# Patient Record
Sex: FEMALE | Race: WHITE | NOT HISPANIC OR LATINO | Employment: FULL TIME | URBAN - NONMETROPOLITAN AREA
[De-identification: names, ages, dates, MRNs, and addresses within clinical notes are randomized per-mention and may not be internally consistent; named-entity substitution may affect disease eponyms.]

---

## 2018-06-28 ENCOUNTER — HOSPITAL ENCOUNTER (OUTPATIENT)
Dept: NON INVASIVE DIAGNOSTICS | Facility: HOSPITAL | Age: 53
Discharge: HOME | End: 2018-06-28
Attending: ORTHOPAEDIC SURGERY
Payer: COMMERCIAL

## 2018-06-28 ENCOUNTER — APPOINTMENT (OUTPATIENT)
Dept: PREADMISSION TESTING | Facility: HOSPITAL | Age: 53
End: 2018-06-28
Payer: COMMERCIAL

## 2018-06-28 ENCOUNTER — TRANSCRIBE ORDERS (OUTPATIENT)
Dept: ADMINISTRATIVE | Facility: HOSPITAL | Age: 53
End: 2018-06-28

## 2018-06-28 ENCOUNTER — APPOINTMENT (OUTPATIENT)
Dept: LAB | Facility: HOSPITAL | Age: 53
End: 2018-06-28
Attending: ORTHOPAEDIC SURGERY
Payer: COMMERCIAL

## 2018-06-28 DIAGNOSIS — Z01.818 PRE-OP EXAM: ICD-10-CM

## 2018-06-28 DIAGNOSIS — Z01.818 PRE-OP EXAM: Primary | ICD-10-CM

## 2018-06-28 LAB
ABO GROUP BLD: NORMAL
ALBUMIN SERPL BCP-MCNC: 3.9 G/DL (ref 3.5–5)
ALP SERPL-CCNC: 85 U/L (ref 46–116)
ALT SERPL W P-5'-P-CCNC: 81 U/L (ref 12–78)
ANION GAP SERPL CALCULATED.3IONS-SCNC: 14 MMOL/L (ref 4–13)
APTT PPP: 31 SECONDS (ref 24–36)
AST SERPL W P-5'-P-CCNC: 34 U/L (ref 5–45)
ATRIAL RATE: 96 BPM
BASOPHILS # BLD MANUAL: 0 THOUSAND/UL (ref 0–0.1)
BASOPHILS NFR MAR MANUAL: 0 % (ref 0–1)
BILIRUB SERPL-MCNC: 0.5 MG/DL (ref 0.2–1)
BILIRUB UR QL STRIP: NEGATIVE
BLD GP AB SCN SERPL QL: NEGATIVE
BUN SERPL-MCNC: 24 MG/DL (ref 5–25)
CALCIUM SERPL-MCNC: 9.7 MG/DL (ref 8.3–10.1)
CHLORIDE SERPL-SCNC: 102 MMOL/L (ref 100–108)
CLARITY UR: NORMAL
CO2 SERPL-SCNC: 24 MMOL/L (ref 21–32)
COLOR UR: YELLOW
CREAT SERPL-MCNC: 0.97 MG/DL (ref 0.6–1.3)
EOSINOPHIL # BLD MANUAL: 0.11 THOUSAND/UL (ref 0–0.4)
EOSINOPHIL NFR BLD MANUAL: 1 % (ref 0–6)
ERYTHROCYTE [DISTWIDTH] IN BLOOD BY AUTOMATED COUNT: 14.3 % (ref 11.6–15.1)
GFR SERPL CREATININE-BSD FRML MDRD: 66 ML/MIN/1.73SQ M
GLUCOSE P FAST SERPL-MCNC: 99 MG/DL (ref 65–99)
GLUCOSE UR STRIP-MCNC: NEGATIVE MG/DL
HCT VFR BLD AUTO: 44.6 % (ref 34.8–46.1)
HGB BLD-MCNC: 15.3 G/DL (ref 11.5–15.4)
HGB UR QL STRIP.AUTO: NEGATIVE
INR PPP: 0.93 (ref 0.86–1.17)
KETONES UR STRIP-MCNC: NEGATIVE MG/DL
LEUKOCYTE ESTERASE UR QL STRIP: NEGATIVE
LYMPHOCYTES # BLD AUTO: 37 % (ref 14–44)
LYMPHOCYTES # BLD AUTO: 4.07 THOUSAND/UL (ref 0.6–4.47)
MCH RBC QN AUTO: 27.1 PG (ref 26.8–34.3)
MCHC RBC AUTO-ENTMCNC: 34.3 G/DL (ref 31.4–37.4)
MCV RBC AUTO: 79 FL (ref 82–98)
MONOCYTES # BLD AUTO: 0.99 THOUSAND/UL (ref 0–1.22)
MONOCYTES NFR BLD: 9 % (ref 4–12)
NEUTROPHILS # BLD MANUAL: 5.61 THOUSAND/UL (ref 1.85–7.62)
NEUTS BAND NFR BLD MANUAL: 2 % (ref 0–8)
NEUTS SEG NFR BLD AUTO: 49 % (ref 43–75)
NITRITE UR QL STRIP: NEGATIVE
P AXIS: 60 DEGREES
PH UR STRIP.AUTO: 6 [PH] (ref 4.5–8)
PLATELET # BLD AUTO: 261 THOUSANDS/UL (ref 149–390)
PMV BLD AUTO: 9.8 FL (ref 8.9–12.7)
POTASSIUM SERPL-SCNC: 3.9 MMOL/L (ref 3.5–5.3)
PR INTERVAL: 146 MS
PROT SERPL-MCNC: 7.2 G/DL (ref 6.4–8.2)
PROT UR STRIP-MCNC: NEGATIVE MG/DL
PROTHROMBIN TIME: 12 SECONDS (ref 11.8–14.2)
QRS AXIS: 56 DEGREES
QRSD INTERVAL: 84 MS
QT INTERVAL: 356 MS
QTC INTERVAL: 449 MS
RBC # BLD AUTO: 5.64 MILLION/UL (ref 3.81–5.12)
RH BLD: POSITIVE
SODIUM SERPL-SCNC: 140 MMOL/L (ref 136–145)
SP GR UR STRIP.AUTO: 1.02 (ref 1–1.03)
SPECIMEN EXPIRATION DATE: NORMAL
T WAVE AXIS: 74 DEGREES
TOTAL CELLS COUNTED SPEC: 100
UROBILINOGEN UR QL STRIP.AUTO: 0.2 E.U./DL
VARIANT LYMPHS # BLD AUTO: 2 %
VENTRICULAR RATE: 96 BPM
WBC # BLD AUTO: 11 THOUSAND/UL (ref 4.31–10.16)

## 2018-06-28 PROCEDURE — 86900 BLOOD TYPING SEROLOGIC ABO: CPT

## 2018-06-28 PROCEDURE — 86901 BLOOD TYPING SEROLOGIC RH(D): CPT

## 2018-06-28 PROCEDURE — 80053 COMPREHEN METABOLIC PANEL: CPT

## 2018-06-28 PROCEDURE — 36415 COLL VENOUS BLD VENIPUNCTURE: CPT

## 2018-06-28 PROCEDURE — 85730 THROMBOPLASTIN TIME PARTIAL: CPT

## 2018-06-28 PROCEDURE — 85027 COMPLETE CBC AUTOMATED: CPT

## 2018-06-28 PROCEDURE — 86850 RBC ANTIBODY SCREEN: CPT

## 2018-06-28 PROCEDURE — 85007 BL SMEAR W/DIFF WBC COUNT: CPT

## 2018-06-28 PROCEDURE — 81003 URINALYSIS AUTO W/O SCOPE: CPT | Performed by: ORTHOPAEDIC SURGERY

## 2018-06-28 PROCEDURE — 85610 PROTHROMBIN TIME: CPT

## 2018-06-28 PROCEDURE — 93010 ELECTROCARDIOGRAM REPORT: CPT | Performed by: INTERNAL MEDICINE

## 2018-06-28 PROCEDURE — 93005 ELECTROCARDIOGRAM TRACING: CPT

## 2018-06-28 RX ORDER — VALSARTAN AND HYDROCHLOROTHIAZIDE 160; 25 MG/1; MG/1
1 TABLET ORAL DAILY
COMMUNITY

## 2018-06-28 RX ORDER — SPIRONOLACTONE 25 MG/1
25 TABLET ORAL DAILY
COMMUNITY

## 2018-06-28 RX ORDER — DIPHENOXYLATE HYDROCHLORIDE AND ATROPINE SULFATE 2.5; .025 MG/1; MG/1
1 TABLET ORAL DAILY
COMMUNITY

## 2018-06-28 RX ORDER — TRAMADOL HYDROCHLORIDE 50 MG/1
50 TABLET ORAL 2 TIMES DAILY
Status: ON HOLD | COMMUNITY
End: 2018-07-11

## 2018-06-28 NOTE — PRE-PROCEDURE INSTRUCTIONS
Pre-Surgery Instructions:   Medication Instructions    diclofenac sodium (VOLTAREN) 50 mg EC tablet Patient was instructed by Physician and understands   multivitamin [facility]) TABS Patient was instructed by Physician and understands   spironolactone (ALDACTONE) 25 mg tablet Instructed patient per Anesthesia Guidelines   traMADol (ULTRAM) 50 mg tablet Instructed patient per Anesthesia Guidelines   valsartan-hydrochlorothiazide (DIOVAN-HCT) 160-25 MG per tablet Instructed patient per Anesthesia Guidelines

## 2018-07-10 ENCOUNTER — ANESTHESIA EVENT (OUTPATIENT)
Dept: PERIOP | Facility: HOSPITAL | Age: 53
DRG: 470 | End: 2018-07-10
Payer: COMMERCIAL

## 2018-07-10 NOTE — ANESTHESIA PREPROCEDURE EVALUATION
Review of Systems/Medical History  Patient summary reviewed  Chart reviewed  No history of anesthetic complications     Cardiovascular  EKG reviewed, Exercise tolerance (METS): >4,  Hypertension controlled,    Pulmonary  Negative pulmonary ROS        GI/Hepatic  Negative GI/hepatic ROS          Negative  ROS        Endo/Other    Obesity    GYN  Negative gynecology ROS          Hematology  Negative hematology ROS      Musculoskeletal    Arthritis     Neurology  Negative neurology ROS      Psychology   Negative psychology ROS              Physical Exam    Airway    Mallampati score: II  TM Distance: >3 FB  Neck ROM: full     Dental   No notable dental hx     Cardiovascular  Rhythm: regular, Rate: normal, Cardiovascular exam normal    Pulmonary  Pulmonary exam normal     Other Findings      Lab Results   Component Value Date    WBC 11 00 (H) 06/28/2018    HGB 15 3 06/28/2018    HCT 44 6 06/28/2018    MCV 79 (L) 06/28/2018     06/28/2018     Lab Results   Component Value Date     06/28/2018    K 3 9 06/28/2018    CO2 24 06/28/2018     06/28/2018    BUN 24 06/28/2018    CREATININE 0 97 06/28/2018     Lab Results   Component Value Date    INR 0 93 06/28/2018    PROTIME 12 0 06/28/2018     Lab Results   Component Value Date    PTT 31 06/28/2018       No results found for: GLUCOSE    No results found for: HGBA1C    Type and Screen:  O      Anesthesia Plan  ASA Score- 2     Anesthesia Type- spinal with ASA Monitors  Additional Monitors:   Airway Plan:         Plan Factors-    Induction-     Postoperative Plan- Plan for postoperative opioid use  Informed Consent- Anesthetic plan and risks discussed with patient

## 2018-07-11 ENCOUNTER — ANESTHESIA (OUTPATIENT)
Dept: PERIOP | Facility: HOSPITAL | Age: 53
DRG: 470 | End: 2018-07-11
Payer: COMMERCIAL

## 2018-07-11 ENCOUNTER — HOSPITAL ENCOUNTER (INPATIENT)
Facility: HOSPITAL | Age: 53
LOS: 3 days | DRG: 470 | End: 2018-07-14
Attending: ORTHOPAEDIC SURGERY | Admitting: ORTHOPAEDIC SURGERY
Payer: COMMERCIAL

## 2018-07-11 DIAGNOSIS — M17.12 PRIMARY OSTEOARTHRITIS OF LEFT KNEE: Primary | ICD-10-CM

## 2018-07-11 DIAGNOSIS — I10 ESSENTIAL HYPERTENSION: ICD-10-CM

## 2018-07-11 PROCEDURE — G8978 MOBILITY CURRENT STATUS: HCPCS | Performed by: PHYSICAL THERAPIST

## 2018-07-11 PROCEDURE — C1776 JOINT DEVICE (IMPLANTABLE): HCPCS | Performed by: ORTHOPAEDIC SURGERY

## 2018-07-11 PROCEDURE — G8987 SELF CARE CURRENT STATUS: HCPCS

## 2018-07-11 PROCEDURE — G8988 SELF CARE GOAL STATUS: HCPCS

## 2018-07-11 PROCEDURE — 97535 SELF CARE MNGMENT TRAINING: CPT

## 2018-07-11 PROCEDURE — 3E0U3BZ INTRODUCTION OF ANESTHETIC AGENT INTO JOINTS, PERCUTANEOUS APPROACH: ICD-10-PCS | Performed by: ORTHOPAEDIC SURGERY

## 2018-07-11 PROCEDURE — 97116 GAIT TRAINING THERAPY: CPT | Performed by: PHYSICAL THERAPIST

## 2018-07-11 PROCEDURE — 97167 OT EVAL HIGH COMPLEX 60 MIN: CPT

## 2018-07-11 PROCEDURE — G8979 MOBILITY GOAL STATUS: HCPCS | Performed by: PHYSICAL THERAPIST

## 2018-07-11 PROCEDURE — 97163 PT EVAL HIGH COMPLEX 45 MIN: CPT | Performed by: PHYSICAL THERAPIST

## 2018-07-11 PROCEDURE — 0SRD069 REPLACEMENT OF LEFT KNEE JOINT WITH OXIDIZED ZIRCONIUM ON POLYETHYLENE SYNTHETIC SUBSTITUTE, CEMENTED, OPEN APPROACH: ICD-10-PCS | Performed by: ORTHOPAEDIC SURGERY

## 2018-07-11 PROCEDURE — C1713 ANCHOR/SCREW BN/BN,TIS/BN: HCPCS | Performed by: ORTHOPAEDIC SURGERY

## 2018-07-11 DEVICE — IMPLANTABLE DEVICE: Type: IMPLANTABLE DEVICE | Site: KNEE | Status: FUNCTIONAL

## 2018-07-11 DEVICE — SMARTSET HV HIGH VISCOSITY BONE CEMENT 40G
Type: IMPLANTABLE DEVICE | Site: KNEE | Status: FUNCTIONAL
Brand: SMARTSET

## 2018-07-11 RX ORDER — MIDAZOLAM HYDROCHLORIDE 1 MG/ML
INJECTION INTRAMUSCULAR; INTRAVENOUS AS NEEDED
Status: DISCONTINUED | OUTPATIENT
Start: 2018-07-11 | End: 2018-07-11 | Stop reason: SURG

## 2018-07-11 RX ORDER — PREGABALIN 50 MG/1
100 CAPSULE ORAL 2 TIMES DAILY
Status: DISCONTINUED | OUTPATIENT
Start: 2018-07-11 | End: 2018-07-14 | Stop reason: HOSPADM

## 2018-07-11 RX ORDER — ACETAMINOPHEN 325 MG/1
975 TABLET ORAL EVERY 8 HOURS SCHEDULED
Status: DISCONTINUED | OUTPATIENT
Start: 2018-07-11 | End: 2018-07-13

## 2018-07-11 RX ORDER — CELECOXIB 200 MG/1
200 CAPSULE ORAL 2 TIMES DAILY
Status: DISCONTINUED | OUTPATIENT
Start: 2018-07-11 | End: 2018-07-14 | Stop reason: HOSPADM

## 2018-07-11 RX ORDER — DEXAMETHASONE SODIUM PHOSPHATE 4 MG/ML
8 INJECTION, SOLUTION INTRA-ARTICULAR; INTRALESIONAL; INTRAMUSCULAR; INTRAVENOUS; SOFT TISSUE ONCE
Status: COMPLETED | OUTPATIENT
Start: 2018-07-11 | End: 2018-07-11

## 2018-07-11 RX ORDER — PROPOFOL 10 MG/ML
INJECTION, EMULSION INTRAVENOUS AS NEEDED
Status: DISCONTINUED | OUTPATIENT
Start: 2018-07-11 | End: 2018-07-11 | Stop reason: SURG

## 2018-07-11 RX ORDER — FENTANYL CITRATE 50 UG/ML
INJECTION, SOLUTION INTRAMUSCULAR; INTRAVENOUS AS NEEDED
Status: DISCONTINUED | OUTPATIENT
Start: 2018-07-11 | End: 2018-07-11 | Stop reason: SURG

## 2018-07-11 RX ORDER — ONDANSETRON 2 MG/ML
4 INJECTION INTRAMUSCULAR; INTRAVENOUS ONCE AS NEEDED
Status: DISCONTINUED | OUTPATIENT
Start: 2018-07-11 | End: 2018-07-11 | Stop reason: HOSPADM

## 2018-07-11 RX ORDER — KETOROLAC TROMETHAMINE 30 MG/ML
INJECTION, SOLUTION INTRAMUSCULAR; INTRAVENOUS AS NEEDED
Status: DISCONTINUED | OUTPATIENT
Start: 2018-07-11 | End: 2018-07-11 | Stop reason: HOSPADM

## 2018-07-11 RX ORDER — BUPIVACAINE HYDROCHLORIDE 2.5 MG/ML
INJECTION, SOLUTION INFILTRATION; PERINEURAL AS NEEDED
Status: DISCONTINUED | OUTPATIENT
Start: 2018-07-11 | End: 2018-07-11 | Stop reason: HOSPADM

## 2018-07-11 RX ORDER — ONDANSETRON 2 MG/ML
INJECTION INTRAMUSCULAR; INTRAVENOUS AS NEEDED
Status: DISCONTINUED | OUTPATIENT
Start: 2018-07-11 | End: 2018-07-11 | Stop reason: SURG

## 2018-07-11 RX ORDER — ASPIRIN 81 MG/1
81 TABLET, CHEWABLE ORAL 2 TIMES DAILY WITH MEALS
Status: DISCONTINUED | OUTPATIENT
Start: 2018-07-11 | End: 2018-07-14 | Stop reason: HOSPADM

## 2018-07-11 RX ORDER — SODIUM CHLORIDE 9 MG/ML
100 INJECTION, SOLUTION INTRAVENOUS CONTINUOUS
Status: DISCONTINUED | OUTPATIENT
Start: 2018-07-11 | End: 2018-07-12

## 2018-07-11 RX ORDER — OXYCODONE HYDROCHLORIDE 5 MG/1
5 TABLET ORAL EVERY 4 HOURS PRN
Status: DISCONTINUED | OUTPATIENT
Start: 2018-07-11 | End: 2018-07-12

## 2018-07-11 RX ORDER — BUPIVACAINE HYDROCHLORIDE 5 MG/ML
INJECTION, SOLUTION PERINEURAL AS NEEDED
Status: DISCONTINUED | OUTPATIENT
Start: 2018-07-11 | End: 2018-07-11 | Stop reason: SURG

## 2018-07-11 RX ORDER — BUPIVACAINE HYDROCHLORIDE AND EPINEPHRINE 2.5; 5 MG/ML; UG/ML
INJECTION, SOLUTION EPIDURAL; INFILTRATION; INTRACAUDAL; PERINEURAL AS NEEDED
Status: DISCONTINUED | OUTPATIENT
Start: 2018-07-11 | End: 2018-07-11 | Stop reason: HOSPADM

## 2018-07-11 RX ORDER — LABETALOL HYDROCHLORIDE 5 MG/ML
10 INJECTION, SOLUTION INTRAVENOUS EVERY 4 HOURS PRN
Status: DISCONTINUED | OUTPATIENT
Start: 2018-07-11 | End: 2018-07-14 | Stop reason: HOSPADM

## 2018-07-11 RX ORDER — CELECOXIB 200 MG/1
200 CAPSULE ORAL ONCE
Status: COMPLETED | OUTPATIENT
Start: 2018-07-11 | End: 2018-07-11

## 2018-07-11 RX ORDER — SODIUM CHLORIDE 9 MG/ML
100 INJECTION, SOLUTION INTRAVENOUS CONTINUOUS
Status: DISCONTINUED | OUTPATIENT
Start: 2018-07-11 | End: 2018-07-11

## 2018-07-11 RX ORDER — SODIUM CHLORIDE, SODIUM LACTATE, POTASSIUM CHLORIDE, CALCIUM CHLORIDE 600; 310; 30; 20 MG/100ML; MG/100ML; MG/100ML; MG/100ML
125 INJECTION, SOLUTION INTRAVENOUS CONTINUOUS
Status: DISCONTINUED | OUTPATIENT
Start: 2018-07-11 | End: 2018-07-11

## 2018-07-11 RX ORDER — OXYCODONE HYDROCHLORIDE 10 MG/1
10 TABLET ORAL EVERY 4 HOURS PRN
Status: DISCONTINUED | OUTPATIENT
Start: 2018-07-11 | End: 2018-07-12

## 2018-07-11 RX ORDER — TRAMADOL HYDROCHLORIDE 50 MG/1
50 TABLET ORAL EVERY 6 HOURS PRN
Qty: 20 TABLET | Refills: 0
Start: 2018-07-11 | End: 2018-07-25 | Stop reason: HOSPADM

## 2018-07-11 RX ORDER — VALSARTAN 80 MG/1
160 TABLET ORAL DAILY
Status: DISCONTINUED | OUTPATIENT
Start: 2018-07-12 | End: 2018-07-14 | Stop reason: HOSPADM

## 2018-07-11 RX ORDER — ROPIVACAINE HYDROCHLORIDE 5 MG/ML
INJECTION, SOLUTION EPIDURAL; INFILTRATION; PERINEURAL AS NEEDED
Status: DISCONTINUED | OUTPATIENT
Start: 2018-07-11 | End: 2018-07-11 | Stop reason: HOSPADM

## 2018-07-11 RX ORDER — PROPOFOL 10 MG/ML
INJECTION, EMULSION INTRAVENOUS CONTINUOUS PRN
Status: DISCONTINUED | OUTPATIENT
Start: 2018-07-11 | End: 2018-07-11 | Stop reason: SURG

## 2018-07-11 RX ORDER — TRAMADOL HYDROCHLORIDE 50 MG/1
50 TABLET ORAL EVERY 6 HOURS PRN
Status: DISCONTINUED | OUTPATIENT
Start: 2018-07-11 | End: 2018-07-12

## 2018-07-11 RX ORDER — PREGABALIN 50 MG/1
100 CAPSULE ORAL ONCE
Status: COMPLETED | OUTPATIENT
Start: 2018-07-11 | End: 2018-07-11

## 2018-07-11 RX ORDER — DICLOFENAC SODIUM 75 MG/1
50 TABLET, DELAYED RELEASE ORAL 2 TIMES DAILY
Status: ON HOLD | COMMUNITY
End: 2018-07-12

## 2018-07-11 RX ORDER — TRANEXAMIC ACID 100 MG/ML
INJECTION, SOLUTION INTRAVENOUS AS NEEDED
Status: DISCONTINUED | OUTPATIENT
Start: 2018-07-11 | End: 2018-07-11 | Stop reason: HOSPADM

## 2018-07-11 RX ORDER — LIDOCAINE HYDROCHLORIDE 20 MG/ML
INJECTION, SOLUTION EPIDURAL; INFILTRATION; INTRACAUDAL; PERINEURAL AS NEEDED
Status: DISCONTINUED | OUTPATIENT
Start: 2018-07-11 | End: 2018-07-11 | Stop reason: SURG

## 2018-07-11 RX ORDER — ACETAMINOPHEN 325 MG/1
975 TABLET ORAL ONCE
Status: COMPLETED | OUTPATIENT
Start: 2018-07-11 | End: 2018-07-11

## 2018-07-11 RX ADMIN — SODIUM CHLORIDE 100 ML/HR: 0.9 INJECTION, SOLUTION INTRAVENOUS at 12:10

## 2018-07-11 RX ADMIN — SODIUM CHLORIDE, SODIUM LACTATE, POTASSIUM CHLORIDE, AND CALCIUM CHLORIDE: .6; .31; .03; .02 INJECTION, SOLUTION INTRAVENOUS at 07:22

## 2018-07-11 RX ADMIN — ACETAMINOPHEN 975 MG: 325 TABLET ORAL at 14:26

## 2018-07-11 RX ADMIN — CELECOXIB 200 MG: 200 CAPSULE ORAL at 07:05

## 2018-07-11 RX ADMIN — TRANEXAMIC ACID 1000 MG: 100 INJECTION, SOLUTION INTRAVENOUS at 07:38

## 2018-07-11 RX ADMIN — ACETAMINOPHEN 975 MG: 325 TABLET ORAL at 21:29

## 2018-07-11 RX ADMIN — MIDAZOLAM HYDROCHLORIDE 1 MG: 1 INJECTION, SOLUTION INTRAMUSCULAR; INTRAVENOUS at 07:21

## 2018-07-11 RX ADMIN — CEFAZOLIN SODIUM 1000 MG: 1 SOLUTION INTRAVENOUS at 16:14

## 2018-07-11 RX ADMIN — ACETAMINOPHEN 975 MG: 325 TABLET ORAL at 07:06

## 2018-07-11 RX ADMIN — MULTIPLE VITAMINS W/ MINERALS TAB 1 TABLET: TAB at 14:26

## 2018-07-11 RX ADMIN — CEFAZOLIN SODIUM 1000 MG: 1 SOLUTION INTRAVENOUS at 23:25

## 2018-07-11 RX ADMIN — PREGABALIN 100 MG: 50 CAPSULE ORAL at 07:06

## 2018-07-11 RX ADMIN — OXYCODONE HYDROCHLORIDE 5 MG: 5 TABLET ORAL at 16:21

## 2018-07-11 RX ADMIN — CELECOXIB 200 MG: 200 CAPSULE ORAL at 18:28

## 2018-07-11 RX ADMIN — ASPIRIN 81 MG 81 MG: 81 TABLET ORAL at 16:14

## 2018-07-11 RX ADMIN — BUPIVACAINE HYDROCHLORIDE 2 ML: 5 INJECTION, SOLUTION EPIDURAL; INTRACAUDAL at 07:31

## 2018-07-11 RX ADMIN — PROPOFOL 50 MCG/KG/MIN: 10 INJECTION, EMULSION INTRAVENOUS at 07:34

## 2018-07-11 RX ADMIN — FENTANYL CITRATE 100 MCG: 50 INJECTION, SOLUTION INTRAMUSCULAR; INTRAVENOUS at 07:25

## 2018-07-11 RX ADMIN — VANCOMYCIN HYDROCHLORIDE 1000 MG: 1 INJECTION, POWDER, LYOPHILIZED, FOR SOLUTION INTRAVENOUS at 07:37

## 2018-07-11 RX ADMIN — CEFAZOLIN SODIUM 2000 MG: 2 SOLUTION INTRAVENOUS at 07:46

## 2018-07-11 RX ADMIN — MIDAZOLAM HYDROCHLORIDE 1 MG: 1 INJECTION, SOLUTION INTRAMUSCULAR; INTRAVENOUS at 07:24

## 2018-07-11 RX ADMIN — ONDANSETRON 4 MG: 2 INJECTION INTRAMUSCULAR; INTRAVENOUS at 08:37

## 2018-07-11 RX ADMIN — PROPOFOL 20 MG: 10 INJECTION, EMULSION INTRAVENOUS at 07:34

## 2018-07-11 RX ADMIN — SODIUM CHLORIDE, SODIUM LACTATE, POTASSIUM CHLORIDE, AND CALCIUM CHLORIDE 125 ML/HR: .6; .31; .03; .02 INJECTION, SOLUTION INTRAVENOUS at 07:11

## 2018-07-11 RX ADMIN — SODIUM CHLORIDE 100 ML/HR: 0.9 INJECTION, SOLUTION INTRAVENOUS at 09:45

## 2018-07-11 RX ADMIN — OXYCODONE HYDROCHLORIDE 5 MG: 5 TABLET ORAL at 23:25

## 2018-07-11 RX ADMIN — PREGABALIN 100 MG: 50 CAPSULE ORAL at 18:28

## 2018-07-11 RX ADMIN — LIDOCAINE HYDROCHLORIDE 3 ML: 20 INJECTION, SOLUTION EPIDURAL; INFILTRATION; INTRACAUDAL; PERINEURAL at 07:28

## 2018-07-11 RX ADMIN — DEXAMETHASONE SODIUM PHOSPHATE 8 MG: 4 INJECTION, SOLUTION INTRAMUSCULAR; INTRAVENOUS at 07:10

## 2018-07-11 RX ADMIN — SODIUM CHLORIDE 100 ML/HR: 0.9 INJECTION, SOLUTION INTRAVENOUS at 21:32

## 2018-07-11 NOTE — OCCUPATIONAL THERAPY NOTE
Occupational Therapy Evaluation     Patient Name: Radha Nettles  IKC[facility] Date: 2018  Problem List  Patient Active Problem List   Diagnosis    Primary osteoarthritis of left knee     Past Medical History  Past Medical History:   Diagnosis Date    Arthritis     Hypertension      Past Surgical History  Past Surgical History:   Procedure Laterality Date    ABDOMINAL SURGERY       SECTION      FOOT SURGERY Right     bunionectomy    UTERINE SUSPENSION             18 1401   Note Type   Note type Eval/Treat   Restrictions/Precautions   Weight Bearing Precautions Per Order Yes   LLE Weight Bearing Per Order WBAT   Other Precautions Chair Alarm; Bed Alarm;Multiple lines;Telemetry; Fall Risk   Pain Assessment   Pain Assessment No/denies pain   Home Living   Type of [address] [place] [address] Two level;Bed/bath upstairs;Stairs to enter without rails  (1 TUTU no HR; 13 steps with HR to 2nd )   Bathroom Shower/Tub Tub/shower unit   Bathroom Toilet Standard   Bathroom Equipment Commode   Bathroom Accessibility [place]   Additional Comments reports she recently purchased a BSC and cane; needs RW for home    Prior Function   Level of [place] Independent with ADLs and functional mobility   Lives With Spouse   ADL Assistance Independent   IADLs Independent   Falls in the last 6 months 0   Vocational Full time employment   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   Psychosocial   Psychosocial (WDL) WDL   ADL   Where Assessed Other (Comment)  (bathroom; EOB)   Grooming Assistance 5  Supervision/Setup   Grooming Deficit Standing with assistive device   LB Dressing Assistance 5  Supervision/Setup   LB Dressing Deficit (don underwear)   Toileting Assistance  5  Supervision/Setup   Toileting Deficit Supervison/safety;Grab bar use   Additional Comments pt donned pull up at bedside and instructed on proper technique for LB dressing after surgery; pt performed toileting tasks at (S) level as well   Bed Mobility   Supine to Sit 5  Supervision   Additional items Bedrails   Sit to Supine (seated in chair at end of session)   Additional Comments SpO2 at start of session was 98% on RA; supine BP: 150/98, HR-116; seated EOB BP-194/84, HR-123; standing BP: 168/101, HR-124; pt did not complain of dizziness throughout session   Transfers   Sit to Stand 5  Supervision   Additional items Verbal cues  (RW)   Stand to Sit 5  Supervision   Additional items Verbal cues  (RW)   Functional Mobility   Functional Mobility 5  Supervision   Additional Comments pt performed FM ~200ft with RW at (S) level with correct sequence of LEs and RW; good safety and good endurance   Additional items Rolling walker   Balance   Static Sitting Good   Dynamic Sitting Good   Static Standing Fair +   Dynamic Standing Fair +   Ambulatory Fair +   Activity Tolerance   Activity Tolerance Patient tolerated treatment well   RUE Assessment   RUE Assessment WFL   LUE Assessment   LUE Assessment WFL   Hand Function   Gross Motor Coordination Functional   Fine Motor Coordination Functional   Sensation   Light Touch No apparent deficits   Sharp/Dull No apparent deficits   Cognition   Overall Cognitive Status WFL   Arousal/Participation Alert   Attention Within functional limits   Orientation Level Oriented X4   Memory Within functional limits   Following Commands Follows all commands and directions without difficulty   Assessment   Limitation Decreased ADL status; Decreased UE strength;Decreased Safe judgement during ADL;Decreased endurance;Decreased self-care trans;Decreased high-level ADLs   Assessment pt presents at evaluation post op day #0, L TKR, WBAT; pt performed at overall (S) level with use of RW; recommend continued OT intervention and plans to attend outpatient PT on d/c starting Monday   Goals   Short Term Goal  pt will perform LB dressing at (I) level   Long Term Goal #1 pt will perform UB/LB bathing and grooming tasks at (I) level Long Term Goal #2 pt will perform FM c RW at mod (I) level with good endurance    Long Term Goal pt will perform all functional transfers at (I) level   Plan   Treatment Interventions ADL retraining;Functional transfer training;UE strengthening/ROM; Endurance training;Patient/family training; Activityengagement   Goal Expiration Date 07/25/18   OT Frequency 3-5x/wk   Recommendation   OT Discharge Recommendation (outpatient PT)   OT - OK to Discharge No   Barthel Index   Feeding 10   Bathing 0   Grooming Score 5   Dressing Score 5   Bladder Score 10   Bowels Score 10   Toilet Use Score 5   Transfers (Bed/Chair) Score 10   Mobility (Level Surface) Score 10   Stairs Score 0   Barthel Index Score 65     Pt will benefit from continued OT services in order to maximize (I) c ADL performance, FM c RW, and improve overall endurance/strength required to complete functional tasks in preparation for d/c  Pt left seated in chair at end of session; all needs within reach; all lines intact; scds connected and turned on

## 2018-07-11 NOTE — ANESTHESIA POSTPROCEDURE EVALUATION
Post-Op Assessment Note      CV Status:  Stable    Post-procedure mental status: sleepy, arousable      Hydration Status:  Euvolemic    PONV Controlled:  Controlled    Airway Patency:  Patent    Post Op Vitals Reviewed: Yes          Staff: Anesthesiologist           /85 (07/11/18 0917)    Temp 97 6 °F (36 4 °C) (07/11/18 0917)    Pulse 85 (07/11/18 0917)   Resp 16 (07/11/18 0917)    SpO2 100 % (07/11/18 0917)

## 2018-07-11 NOTE — ANESTHESIA PROCEDURE NOTES
Spinal Block    Patient location during procedure: OR  Start time: 7/11/2018 7:25 AM  End time: 7/11/2018 7:31 AM  Staffing  Anesthesiologist: Guillermo García  Performed: anesthesiologist   Preanesthetic Checklist  Completed: patient identified, surgical consent, pre-op evaluation, timeout performed, IV checked, risks and benefits discussed and monitors and equipment checked  Spinal Block  Patient position: sitting  Prep: Betadine  Patient monitoring: continuous pulse ox and frequent blood pressure checks  Approach: midline  Location: L3-4  Injection technique: single-shot  Needle  Needle type: pencil-tip   Needle gauge: 25 G  Needle length: 10 cm  Assessment  Sensory level: T10  Injection Assessment:  negative aspiration for heme, no paresthesia on injection and positive aspiration for clear CSF    Post-procedure:  site cleaned  Additional Notes  Sterile prep and drape in accordance with protocol

## 2018-07-11 NOTE — DISCHARGE INSTRUCTIONS
DISCHARGE INSTRUCTIONS  Theodore    Please carefully read this instruction sheet regarding your surgical procedure  We hope this will answer any questions you have  If after reading these instructions you still have questions regarding our surgery, please contact our office at [phone]  What You Need To Do    PAIN MANAGEMENT  You will receive prescriptions to help control your pain  Follow the directions written on the prescription bottles  Have the prescriptions filled whether you think you need to or not  Please make sure you have food in your stomach  Asprin 81 mg  2x/day for 4 weeks  Tylenol 1000 mg  3x/day for 2 weeks  Oxycodone 5 mg  As needed only for severe pain  May be taken every 4-6 hours as necessary  Celebrex 200 mg  2x/day for 2 weeks    ONLY AFTER CELEBREX IS COMPLETE:     Patient may take Ibuprofen (Advil) 800 mg 3x/day    PHYSICAL THERAPY  Physical therapy will be the day after surgery  A prescription will be given to you to take to PT, or you will be instructed in a home therapy exercise program       ICE  The use of ice following surgery can decrease pain and swelling  Apply ice to the operative area for 30 minutes at a time  Remove the ice and keep it off for 30 minutes then reapply  Do this while awake as much as possible during the day after surgery  Frostbite injury should be avoided by preventing direct contact of ice on the skin  DRESSING: Dry Optifoam dressing stays on for 6 days  If significant blood appears on the dressing, then remove and reapply a dry sterile dressing  SHOWER: Wait 24 hours before you can shower  Optifoam dressing can get wet  There is no need to wrap with Saran wrap  Pat dry  Only if water seeps below the dressing, then remove and reapply new sterile dressing       ACTIVITY AFTER SURGERY:  Ankle Pumps: 10 ankle pumps every hour while awake  Frequent mobility: Get uo out of bed for 10-15 minutes every hour while awake  Elevate your feet above the level of your heart while in bed

## 2018-07-11 NOTE — CASE MANAGEMENT
Thank you,  Marisol Aqq  [facility] Utilization Review Department  Phone: [phone]; Fax [phone]  ATTENTION: The Network Utilization Review Department is now centralized for our 9 Facilities  Make a note that we have a new phone and fax numbers for our Department  Please call with any questions or concerns to [phone] and carefully follow the prompts so that you are directed to the right person  All voicemails are confidential  Fax any determinations, approvals, denials, and requests for initial or continue stay review clinical to [phone]  Due to HIGH CALL volume, it would be easier if you could please send faxed requests to expedite your requests and in part, help us provide discharge notifications faster  Initial Clinical Review    Age/Sex: 52 y o  female    Surgery Date: 7/11/18    Procedure: Total Knee Arthroplasty /left) knee; Injection/aspiration (left/) knee    Anesthesia: Spinal    Admission Orders: Date/Time/Statement: INPT  7/11/18 @ 9386   [80221 (CPT®)]    Orders Placed This Encounter   Procedures    Inpatient Admission     Standing Status:   Standing     Number of Occurrences:   1     Order Specific Question:   Admitting Physician     Answer:   Brian Wilcox [[id number]]     Order Specific Question:   Level of Care     Answer:   Med Surg [16]     Order Specific Question:   Estimated length of stay     Answer:   Not Applicable     Comments:   IP authorization for one day       Vital Signs: /65 (BP Location: Left arm)   Pulse 95   Temp (!) 97 3 °F (36 3 °C) (Temporal)   Resp 17   Ht 5' 2" (1 575 m)   Wt 97 3 kg (214 lb 9 6 oz)   SpO2 96%   Breastfeeding?  No   BMI 39 25 kg/m²     Diet:        Diet Orders            Start     Ordered    07/12/18 0000  Diet NPO  Diet effective midnight     Question:  Diet Type  Answer:  NPO    07/11/18 0628    07/11/18 1154  Diet Regular; Regular House  Diet effective now     Question Answer Comment   Diet Type Regular Regular Regular House    RD to adjust diet per protocol?  Yes        07/11/18 6113          Mobility: ACTIVITY AS [place]    DVT Prophylaxis: SEQ COMP DEVICE    Pain Control:   Pain Medications             diclofenac (VOLTAREN) 75 mg EC tablet Take 50 mg by mouth 2 (two) times a day Pt takes PRN    traMADol (ULTRAM) 50 mg tablet Take 1 tablet (50 mg total) by mouth every 6 (six) hours as needed for moderate pain for up to 10 days        INPT  WEIGHT BEARING RESTRICTIONS  NEUROVASCULAR CHECKS Q4H  PT/OT  IS  WALKER ROLLING    Scheduled Meds:  Current Facility-Administered Medications:  acetaminophen 975 mg Oral Q8H [address] Emmanuel Carlson PA-C   aspirin 81 mg Oral BID With Meals Nazario Barrera MD   cefazolin 1,000 mg Intravenous Q8H Emmanuel Carlson PA-C   celecoxib 200 mg Oral BID Emmanuel Carlson PA-C   labetalol 10 mg Intravenous Q4H PRN Phyllis Garrett DO   multivitamin-minerals 1 tablet Oral Daily Nazario Barrera MD   oxyCODONE 10 mg Oral Q4H PRN Emmanuel Carlson PA-C   oxyCODONE 5 mg Oral Q4H PRN Emmanuel Carlson PA-C   pregabalin 100 mg Oral BID Emmanuel Carlson PA-C   sodium chloride 100 mL/hr Intravenous Continuous Phyllis Garrett DO   traMADol 50 mg Oral Q6H PRN Emmanuel Carlson PA-C   [START ON 7/12/2018] valsartan 160 mg Oral Daily Phyllis Garrett DO     Continuous Infusions:  sodium chloride 100 mL/hr     PRN Meds: labetalol    oxyCODONE    oxyCODONE    traMADol

## 2018-07-11 NOTE — PHYSICAL THERAPY NOTE
PT Evaluation     07/11/18 1431   Note Type   Note type Eval/Treat   Pain Assessment   Pain Score No Pain   Pain Location Knee   Pain Orientation Left   Home Living   Type of Home House   Home Layout Multi-level;Bed/bath upstairs;Stairs to enter without rails  (1 TUTU, 13 steps with 1 and 1/2 handrails)   Bathroom Shower/Tub Tub/shower unit   Bathroom Toilet Standard   Bathroom Equipment Commode   Bathroom Accessibility Accessible   Home Equipment Cane   Additional Comments needs RW to return home with   Prior Function   Level of [place] Independent with ADLs and functional mobility   Lives With Spouse   ADL Assistance Independent   IADLs Independent   Vocational Full time employment   Comments (I) with driving   Restrictions/Precautions   Weight Bearing Precautions Per Order Yes   LLE Weight Bearing Per Order WBAT   Other Precautions Chair Alarm;Multiple lines;Telemetry; Fall Risk;WBS   General   Family/Caregiver Present No   Cognition   Arousal/Participation Alert   Orientation Level Oriented X4   Following Commands Follows all commands and directions without difficulty   RLE Assessment   RLE Assessment WFL  (4+/5)   LLE Assessment   LLE Assessment X  ((I) SLR, knee flex to 90 degrees ankle WFL)   Coordination   Sensation X   Light Touch   RLE Light Touch Impaired   RLE Light Touch Comments bilateral foot tingling   LLE Light Touch Impaired   LLE Light Touch Comments bilateral foot tingling   Bed Mobility   Supine to Sit 5  Supervision   Additional items Bedrails;Verbal cues   Sit to Supine (seated in chair alarm on and bell in reach)   Additional Comments supine /98 , seated 195/84  and standing 168/101    Transfers   Sit to Stand 5  Supervision   Additional items Bedrails;Verbal cues  (with RW)   Stand to Sit 5  Supervision   Additional items Verbal cues  (with RW)   Stand pivot 5  Supervision   Additional items Verbal cues  (with RW)   Additional Comments No LOB during transfers and ambulation  Pt able to ambulate with a step through gait pattern and no LOB or complaint of pain dizziness or lightheadedness   Ambulation/Elevation   Gait pattern Decreased L stance; Short stride   Gait Assistance 5  Supervision  (CGA)   Assistive Device Rolling walker   Distance 250ft with RW CGA/Supervision   Balance   Static Sitting Good   Dynamic Sitting Good   Static Standing Fair  (with RW)   Dynamic Standing Fair  (with RW)   Ambulatory Fair  (with RW)   Endurance Deficit   Endurance Deficit Yes   Endurance Deficit Description limited ambulation tolerance due to fatigue   Activity Tolerance   Activity Tolerance Patient tolerated treatment well;Patient limited by fatigue   Assessment   Prognosis Good   Problem List Decreased strength;Decreased range of motion;Decreased endurance; Impaired balance;Decreased mobility; Impaired sensation;Decreased safety awareness;Orthopedic restrictions   Assessment Pt is a 52year old female s/p L TKR WBAT L LE  Pt presents with fair to good ROM strength balance endurance and functional mobility however requires verbal cues for safety and technique during transfers and ambulation with use of RW  Pt is in need of continued activity in PT to improve ROM pain strength balance endurance and all mobility including stair negotiation with return to (I) LOF  Goals   Patient Goals To get stronger and return to work   STG Expiration Date 07/18/18   Short Term Goal #1 (I) with all bed mobility and transfers with RW   Short Term Goal #2 Pt will ambulate 400ft with RW modified Independent and will ascend/descend 11 steps with HR (S)   Plan   Treatment/Interventions Functional transfer training;LE strengthening/ROM; Elevations; Therapeutic exercise; Endurance training;Patient/family training;Bed mobility;Gait training   PT Frequency (BID Mon-Friday and one time sat, sun)   Recommendation   Recommendation Outpatient PT   PT - OK to Discharge No  (when medically stable for discharge)   Pt with SCD's on when PT entered room  SCD's reapplied and turned on with pt seated in chair at bedside with call bell in reach and chair alarm on

## 2018-07-11 NOTE — OP NOTE
Op Note        []Hide copied text  []Alexys for attribution information  Total Knee Arthroplasty Procedure Note         Indications: Severe osteoarthritis /left knee with symptoms limiting function and activities of dailing living including pain after one half flight of steps and descending slowly and one at a time  Unresponsive to NSAIDS, rehabilitation exercises for more than 12 weeks now causing pain, injections of steroids, over the counter knee braces  Assistive walking devices were not helpful or practical Neutriceuticals provides some benefit  Patient describes chronic discomfort and pain  Pre-operative Diagnosis: Degenerative primary osteoarthritis /left knee; morbid obesity     Post-operative Diagnosis: Degenerative arthritis /left knee with varus/valgus deformity     Procedure: Total Knee Arthroplasty /left) knee; Injection/aspiration (left/) knee     Surgeon Zachary Gaitan MD  Assistant Lucy [facility]   Anesthesia: Spinal  Implants Used:  Drains None  Specimen None  Complications NONE  Blood loss: Minimal  Finding:s Primary Osteoarthritis  Fluids: as per anesthesia record        Procedure Details   The patient was seen in the pre-op area  The risks, benefits, complications, treatment options, and expected outcomes were again discussed with the patient  The risks and potential complications of their problem and purposed treatment include but are not limited to infection, bleeding, pain, stiffness,life and limb loss, nerve and vessel injury and complication secondary to the anesthetic  The patient concurred with the proposed plan, giving informed consent and video and photography consent  The site of surgery properly noted/marked  The patient was taken to Operating Room, identified again, procedure verified as right/leftb Total Knee Arthroplasty  A Time Out was held and the above information confirmed      The patient was  placed on the operating table in a supine position   Following the successful induction of anesthesia the right/left lower extremity was scrubbed, prepped and draped in the usual sterile fashion  The  extremity was then elevated, wrapped with a sterile Esmarch, and the tourniquet was inflated to 250 mmHg  A  minimal longitudinal anteromedial incision was made  A medial one-third extensor mechanism approach was performed, with VMO split  The patella was subluxed but not everted and the knee was examined       There was extensive loss of articular cartilage in the medial compartment of the  knee with bone exposed on both the medial femoral condyle and the medial tibial plateau  There was significant degenerative changes involving the patella femoral joint  Using extramedulary femoral/tibial guide systems, routine cuts and anatomic alignment decisions were made for a # 3 right femur with a #3 tibial bone plate preserving a posterior cruciate  with a small bone island posterocentrally and achieving a balance flexion/extension gap with a #11 mm high-density spacer  This allowed extension to zero and full flexion with a balanced flexion/extension gap and normal contact points in the polyethylene in both flexion and extension  The patella was resurfaced with a #-29mm  patella which was medialized  to correspond with a normal central eminence of patella  The patellofemoral tracking was normal  No lateral impingement was present       Having completed the trial reduction, all trial components were removed  A periarticular injection was performed in 7 zones with 120cc's total of Bupivacaine , Ropivacaine  and normal saline for post operative pain relief  The tourniquet was released, hemostasis was obtained and the knee was irrigated with saline and dilute betadine  With routine cement technique then sequentially, the patellar component, tibial component  were cemented in place  The femoral component was cemented/ press fit   Once the cement had cured and all extra cement had been removed, the 11#-mm high-density polyethylene spacer was locked in position on the tibial base plate      Final reduction was the same as the trial reduction with full range of motion, normal stability, normal extensor mechanism function and a normal patellar femoral tracking      Having completed the procedure, the knee was thoroughly cleaned with pulse lavage saline and dilute betadine  The synovium and capsule were then closed with #1 Vicryl,stratofix, extensor mechanism with double-layer #1 Vicryl, subcutaneous with 2-0 Vicryl       The knee was then aspirated of 10 cc serosanguinous fluid and injected with TXA, Torodol, and bupivacaine  The  skin was closed with stratofix and skin glue  A sterile Ag occlusive dressing was applied       Instrument, sponge, and needle counts were correct prior to wound closure and at the conclusion of the case  A PA was necessary for the entirety of the case  To assist with safe transfer, set up , draping, prepping and all aspects of the procedure including the approach, retraction ,assisting with bone cuts, trial and final implant positioning, deep and superficial closure, mixing of cement, dressing application, decreasing operative, tourniquet, anesthesia time and morbidity  No residents were assisting      Mod 22- Morbid obesity- BMI 40   More than 100 lbs above ideal weight made this case 30% more difficult than a mckinley patient, requiring a FTE to hold leg at 90 dur to spring load effect of abundant soft tissue envelope, deep abdominal retractors necessary to retract 2 plus inches of tissue; case required extensile approach, incision, extended tourniquet, anesthesia and operative time adipose tissue                 Disposition: PACU      Condition: stable     Attending Attestation: I was present for the entire procedure        Tracy Lama MD

## 2018-07-11 NOTE — DISCHARGE SUMMARY
ORTHOPEDICS DISCHARGE SUMMARY        @[facility]@         Procedure: Left TKR    HPI:  This is a 52y o  year old female that presented to the office with signs and symptoms of left knee osteoarthritis  They tried and failed conservative treatment measures and wished to proceed with surgical intervention  The risks, benefits, and complications of the procedure were discussed with the patient and informed consent was obtained  Hospital Course: The patient was admitted to the hospital on 7/11/2018 and underwent an uncomplicated left total knee arthroplasty  They were transferred to the floor after a brief stay in the post-anesthesia care unit  Their pain was well managed with IV and oral pain medications  They began therapy on the day of surgery  Aspirin was also started for DVT prophylaxis post operative day #1  Patient did well - was ambulating safely with pain well managed  Anxious for Discharge   On discharge date pt was cleared by PT and the medicine team and determined to be safe for discharge  Daily discussion was had with the patient, nursing staff, orthopaedic team, and family members if present  All questions were answered to the patients satisifaction  0  Lab Value Date/Time   HGB 15 3 06/28/2018 1036         Vital signs remained stable and pt was resuscitated with IVF as needed     Discharge Instructions: The patient was discharged weight bearing as tolerated to the left lower extremity  Continue PT/OT  Take pain medications as instructed  Follow-up in office as scheduled in 2-3 weeks  Sherre Soulier Discharge Medications: For the complete list of discharge medications, please refer to the patient's medication reconciliation

## 2018-07-11 NOTE — SOCIAL WORK
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  Pt and her  both drive  Pt has never had home care or any services in the home  Pt's PCP is Dr Milly Richards   Pt uses the [facility] in [facility]  Pt's Plan is to start PT on the following Monday  At [address]  in [facility]  Case Management reviewed discharge planning process including the following: identifying help that is needed at home, pt's preference for discharge needs and Meds at [facility]  Reviewed with Pt that any member of the healthcare team can answer questions regarding : medications, jmportance of recognizing  Signs and symptoms of any  medical problems  Case Management also encouraged pt to follow up with all recommended appointments after discharge  Savannah Romero

## 2018-07-12 PROBLEM — D72.829 LEUKOCYTOSIS: Status: ACTIVE | Noted: 2018-07-12

## 2018-07-12 PROBLEM — I10 ESSENTIAL HYPERTENSION: Status: ACTIVE | Noted: 2018-07-12

## 2018-07-12 LAB
ALBUMIN SERPL BCP-MCNC: 2.8 G/DL (ref 3.5–5)
ALP SERPL-CCNC: 65 U/L (ref 46–116)
ALT SERPL W P-5'-P-CCNC: 52 U/L (ref 12–78)
ANION GAP SERPL CALCULATED.3IONS-SCNC: 10 MMOL/L (ref 4–13)
AST SERPL W P-5'-P-CCNC: 20 U/L (ref 5–45)
BASOPHILS # BLD MANUAL: 0 THOUSAND/UL (ref 0–0.1)
BASOPHILS NFR MAR MANUAL: 0 % (ref 0–1)
BILIRUB DIRECT SERPL-MCNC: 0.1 MG/DL (ref 0–0.2)
BILIRUB SERPL-MCNC: 0.5 MG/DL (ref 0.2–1)
BUN SERPL-MCNC: 23 MG/DL (ref 5–25)
CALCIUM SERPL-MCNC: 8.6 MG/DL (ref 8.3–10.1)
CHLORIDE SERPL-SCNC: 103 MMOL/L (ref 100–108)
CO2 SERPL-SCNC: 25 MMOL/L (ref 21–32)
CREAT SERPL-MCNC: 0.87 MG/DL (ref 0.6–1.3)
EOSINOPHIL # BLD MANUAL: 0.14 THOUSAND/UL (ref 0–0.4)
EOSINOPHIL NFR BLD MANUAL: 1 % (ref 0–6)
ERYTHROCYTE [DISTWIDTH] IN BLOOD BY AUTOMATED COUNT: 14.6 % (ref 11.6–15.1)
GFR SERPL CREATININE-BSD FRML MDRD: 75 ML/MIN/1.73SQ M
GLUCOSE SERPL-MCNC: 135 MG/DL (ref 65–140)
HCT VFR BLD AUTO: 36.5 % (ref 34.8–46.1)
HGB BLD-MCNC: 12.2 G/DL (ref 11.5–15.4)
LYMPHOCYTES # BLD AUTO: 17 % (ref 14–44)
LYMPHOCYTES # BLD AUTO: 2.36 THOUSAND/UL (ref 0.6–4.47)
MAGNESIUM SERPL-MCNC: 2 MG/DL (ref 1.6–2.6)
MCH RBC QN AUTO: 27.5 PG (ref 26.8–34.3)
MCHC RBC AUTO-ENTMCNC: 33.4 G/DL (ref 31.4–37.4)
MCV RBC AUTO: 82 FL (ref 82–98)
MONOCYTES # BLD AUTO: 0.83 THOUSAND/UL (ref 0–1.22)
MONOCYTES NFR BLD: 6 % (ref 4–12)
NEUTROPHILS # BLD MANUAL: 10.56 THOUSAND/UL (ref 1.85–7.62)
NEUTS BAND NFR BLD MANUAL: 1 % (ref 0–8)
NEUTS SEG NFR BLD AUTO: 75 % (ref 43–75)
PHOSPHATE SERPL-MCNC: 3.1 MG/DL (ref 2.7–4.5)
PLATELET # BLD AUTO: 188 THOUSANDS/UL (ref 149–390)
PMV BLD AUTO: 10.4 FL (ref 8.9–12.7)
POTASSIUM SERPL-SCNC: 3.9 MMOL/L (ref 3.5–5.3)
PROT SERPL-MCNC: 5.9 G/DL (ref 6.4–8.2)
RBC # BLD AUTO: 4.44 MILLION/UL (ref 3.81–5.12)
SODIUM SERPL-SCNC: 138 MMOL/L (ref 136–145)
TOTAL CELLS COUNTED SPEC: 100
WBC # BLD AUTO: 13.9 THOUSAND/UL (ref 4.31–10.16)

## 2018-07-12 PROCEDURE — 80048 BASIC METABOLIC PNL TOTAL CA: CPT | Performed by: PHYSICIAN ASSISTANT

## 2018-07-12 PROCEDURE — 97535 SELF CARE MNGMENT TRAINING: CPT

## 2018-07-12 PROCEDURE — 97530 THERAPEUTIC ACTIVITIES: CPT

## 2018-07-12 PROCEDURE — 97116 GAIT TRAINING THERAPY: CPT

## 2018-07-12 PROCEDURE — 99253 IP/OBS CNSLTJ NEW/EST LOW 45: CPT | Performed by: INTERNAL MEDICINE

## 2018-07-12 PROCEDURE — 99024 POSTOP FOLLOW-UP VISIT: CPT

## 2018-07-12 PROCEDURE — 85027 COMPLETE CBC AUTOMATED: CPT | Performed by: INTERNAL MEDICINE

## 2018-07-12 PROCEDURE — 83735 ASSAY OF MAGNESIUM: CPT | Performed by: INTERNAL MEDICINE

## 2018-07-12 PROCEDURE — 97110 THERAPEUTIC EXERCISES: CPT | Performed by: PHYSICAL THERAPIST

## 2018-07-12 PROCEDURE — 80076 HEPATIC FUNCTION PANEL: CPT | Performed by: INTERNAL MEDICINE

## 2018-07-12 PROCEDURE — 97530 THERAPEUTIC ACTIVITIES: CPT | Performed by: PHYSICAL THERAPIST

## 2018-07-12 PROCEDURE — 84100 ASSAY OF PHOSPHORUS: CPT | Performed by: INTERNAL MEDICINE

## 2018-07-12 PROCEDURE — 85007 BL SMEAR W/DIFF WBC COUNT: CPT | Performed by: INTERNAL MEDICINE

## 2018-07-12 RX ORDER — PREGABALIN 100 MG/1
100 CAPSULE ORAL 2 TIMES DAILY
Qty: 20 CAPSULE | Refills: 0 | Status: ON HOLD
Start: 2018-07-12 | End: 2018-07-23

## 2018-07-12 RX ORDER — OXYCODONE HYDROCHLORIDE 10 MG/1
10 TABLET ORAL EVERY 6 HOURS PRN
Refills: 0 | Status: ON HOLD
Start: 2018-07-12 | End: 2018-07-23

## 2018-07-12 RX ORDER — CELECOXIB 200 MG/1
200 CAPSULE ORAL 2 TIMES DAILY
Refills: 0 | Status: ON HOLD
Start: 2018-07-12 | End: 2018-07-23

## 2018-07-12 RX ORDER — MORPHINE SULFATE 2 MG/ML
2 INJECTION, SOLUTION INTRAMUSCULAR; INTRAVENOUS
Status: DISCONTINUED | OUTPATIENT
Start: 2018-07-12 | End: 2018-07-13

## 2018-07-12 RX ORDER — TRAMADOL HYDROCHLORIDE 50 MG/1
50 TABLET ORAL EVERY 6 HOURS PRN
Qty: 30 TABLET | Refills: 0 | Status: ON HOLD
Start: 2018-07-12 | End: 2018-07-14

## 2018-07-12 RX ORDER — VALSARTAN 160 MG/1
160 TABLET ORAL DAILY
Refills: 0 | Status: ON HOLD
Start: 2018-07-13 | End: 2018-07-14

## 2018-07-12 RX ORDER — OXYCODONE HYDROCHLORIDE 5 MG/1
5 TABLET ORAL EVERY 4 HOURS PRN
Status: DISCONTINUED | OUTPATIENT
Start: 2018-07-12 | End: 2018-07-13

## 2018-07-12 RX ORDER — ASPIRIN 81 MG/1
81 TABLET, CHEWABLE ORAL 2 TIMES DAILY WITH MEALS
Refills: 0
Start: 2018-07-12

## 2018-07-12 RX ADMIN — PREGABALIN 100 MG: 50 CAPSULE ORAL at 08:12

## 2018-07-12 RX ADMIN — MORPHINE SULFATE 2 MG: 2 INJECTION, SOLUTION INTRAMUSCULAR; INTRAVENOUS at 12:13

## 2018-07-12 RX ADMIN — OXYCODONE HYDROCHLORIDE 10 MG: 10 TABLET ORAL at 10:50

## 2018-07-12 RX ADMIN — MULTIPLE VITAMINS W/ MINERALS TAB 1 TABLET: TAB at 08:12

## 2018-07-12 RX ADMIN — OXYCODONE HYDROCHLORIDE 5 MG: 5 TABLET ORAL at 23:34

## 2018-07-12 RX ADMIN — OXYCODONE HYDROCHLORIDE 5 MG: 5 TABLET ORAL at 08:11

## 2018-07-12 RX ADMIN — ACETAMINOPHEN 975 MG: 325 TABLET ORAL at 22:27

## 2018-07-12 RX ADMIN — MORPHINE SULFATE 2 MG: 2 INJECTION, SOLUTION INTRAMUSCULAR; INTRAVENOUS at 18:17

## 2018-07-12 RX ADMIN — PREGABALIN 100 MG: 50 CAPSULE ORAL at 17:46

## 2018-07-12 RX ADMIN — MORPHINE SULFATE 2 MG: 2 INJECTION, SOLUTION INTRAMUSCULAR; INTRAVENOUS at 21:26

## 2018-07-12 RX ADMIN — VALSARTAN 160 MG: 80 TABLET, FILM COATED ORAL at 08:12

## 2018-07-12 RX ADMIN — CELECOXIB 200 MG: 200 CAPSULE ORAL at 08:11

## 2018-07-12 RX ADMIN — ASPIRIN 81 MG 81 MG: 81 TABLET ORAL at 17:05

## 2018-07-12 RX ADMIN — OXYCODONE HYDROCHLORIDE 5 MG: 5 TABLET ORAL at 03:51

## 2018-07-12 RX ADMIN — ASPIRIN 81 MG 81 MG: 81 TABLET ORAL at 08:12

## 2018-07-12 RX ADMIN — CELECOXIB 200 MG: 200 CAPSULE ORAL at 17:46

## 2018-07-12 RX ADMIN — ACETAMINOPHEN 975 MG: 325 TABLET ORAL at 15:28

## 2018-07-12 RX ADMIN — MORPHINE SULFATE 2 MG: 2 INJECTION, SOLUTION INTRAMUSCULAR; INTRAVENOUS at 15:02

## 2018-07-12 RX ADMIN — ACETAMINOPHEN 975 MG: 325 TABLET ORAL at 05:04

## 2018-07-12 NOTE — OCCUPATIONAL THERAPY NOTE
OccupationalTherapy Progress Note     Patient Name: Ildefonso Coronado  JKNNK'A Date: 7/12/2018  Problem List  Patient Active Problem List   Diagnosis    Primary osteoarthritis of left knee    Essential hypertension    Leukocytosis               07/12/18 1002   Restrictions/Precautions   Weight Bearing Precautions Per Order Yes   LLE Weight Bearing Per Order WBAT   ADL   Grooming Assistance 5  Supervision/Setup   Grooming Deficit Setup   Grooming Comments Pt completed oral hygiene this date wihich she said she likes to complete twice a day  UB Bathing Comments deferred at this time   LB Bathing Comments deferred at this time   LB Dressing Comments deferred at this time   Transfers   Additional Comments deferred at this time   Functional Mobility   Additional Comments deferred at this time   Assessment   Assessment Pt presents seated in bedside chair napping at this time  Pt stating she tried walking this am and it did not go well she was having a lot of pain and was not really up for doing anything  Pt deferred getting washed up, putting new gown on, as well as FM this date with OT  Pt cooperative to complete oral hygiene this date as this is something important to her and her daily routine  Pt deferring other therapy options at this time  Pt finished session with all needs in reach, lines in tact, and SCDs powered on and applied

## 2018-07-12 NOTE — PROGRESS NOTES
Pt still having 10/10 pain with tears despite being given Oxy 10 mg for break through pain and constant ice  Paged María Elena Dangelo and consulted Dr Tima Lyn  New Orders noted from Dr Tima Lyn for pain  María Elena Dangelo at bedside  Spoke with Hay Cornelius and Dr Becca Frazier over the phone  New orders noted from María Elena Dangelo  Morphine 2mg given as ordered for severe pain  Will continue to monitor

## 2018-07-12 NOTE — PHYSICAL THERAPY NOTE
PT treatment Note      07/12/18 1335   Restrictions/Precautions   Weight Bearing Precautions Per Order Yes   LLE Weight Bearing Per Order WBAT   Subjective   Subjective c/o severe L knee pain  Reports the pain medication has helped a little  States I didn't expect this to be so painful  Ready to return to bed  Bed Mobility   Sit to Supine 4  Minimal assistance   Additional items Assist x 2;Bedrails;Verbal cues;LE management   Additional Comments max x 2 to positon in bed   Transfers   Sit to Stand 4  Minimal assistance   Additional items Assist x 1; Armrests; Verbal cues   Stand to Sit 4  Minimal assistance   Additional items Assist x 1;Bedrails;Verbal cues   Stand pivot 4  Minimal assistance   Additional items Assist x 1   Ambulation/Elevation   Gait pattern (Antalgic;Decreased L stance; Short stride)   Gait Assistance (CGA)   Additional items Assist x 1   Assistive Device Rolling walker   Distance 15'   Balance   Static Sitting Good   Dynamic Sitting Good   Static Standing Fair   Dynamic Standing [id number]  (with RW)   Endurance Deficit   Endurance Deficit Yes   Activity Tolerance   Activity Tolerance Patient limited by pain   Exercises   Quad Sets 10 reps   Heelslides 10 reps;AAROM   Glute Sets 15 reps   Ankle Pumps 10 reps   Assessment   Prognosis Good   Problem List Decreased strength;Decreased range of motion;Decreased endurance; Impaired balance;Decreased mobility;Orthopedic restrictions;Pain   Assessment Pt  experiencing high pain levels limiting mobility  Increased assit required to transfer back to bed  Knee ROM limited by pain and swelling  Pt is in need of continued activity in PT to improve ROM pain strength balance endurance and all mobility including stair negotiation with return to (I) LOF  Plan   Treatment/Interventions Functional transfer training;LE strengthening/ROM; Therapeutic exercise; Endurance training;Bed mobility;Gait training   Progress Slow progress, decreased activity tolerance   Recommendation   Recommendation Home PT;Outpatient PT   Pt  In bed  with call bell within reach, scd's connected and turned on and alarm on at end of PT session

## 2018-07-12 NOTE — PROGRESS NOTES
Patient started with severe left medial knee pain couple hours ago  This was after she started ambulate with physical therapy  She points to the inside of her left knee  There is no wound disruption  The original dressing is in place and remains clean and dry  No bleeding noted  She relates her pain as a 10 on a 10 point pain scale  She was given oxycodone 5 mg around 830 this morning  She then received another 10 mg for breakthrough pain of oxycodone  In addition the hospitalist physician had ordered IV morphine to be given for severe pain  Patient remains quite anxious  She is sitting up in bedside chair  Ice has been applied to the left knee  There is no deformity  There is mild amount of left knee effusion  No bruising  The Mepilex dressing is entirely clean without any drainage  The patient has point tenderness over the left medial tibial plateau  It is believed that her pain is probably bone pain from her surgery yesterday  The patient's RN attempted to call the office 3 times over the past hour and was put on hold  This provider was contacted by the same RN to evaluate the patient  This provider saw the patient early this morning and the patient was comfortable without much pain earlier today  It is believed that her nerve block is likely wearing off and she is now having breakthrough pain  The PA with [facility], Kelly Connell, was personally contacted by this provider over the telephone to apprise him of the patient's level of discomfort  Dr Rose Bueno personally spoke with Cristy Soulier, RN, over the telephone at this time  Will cancel the patient's hospital discharge for today  Pain medications were reviewed  Apply ice to the left knee  Any questions about the patient should be directed to Dr Lockwood office for further orders or instructions      Latisha Duarte PA-C

## 2018-07-12 NOTE — PROGRESS NOTES
Orthopedics   Boris Briggs 52 y o  female MRN: [id number]  Unit/Bed#: 420-01      Subjective:  52 y  o female post operative day #1 left total knee arthroplasty  Pt doing well  Pain controlled  Patient started with PT yesterday  She offers no new complaints this morning  Tolerated diet well  Labs:    0  Lab Value Date/Time   HCT 36 5 07/12/2018 0416   HCT 44 6 06/28/2018 1036   HGB 12 2 07/12/2018 0416   HGB 15 3 06/28/2018 1036   INR 0 93 06/28/2018 1036   WBC 13 90 (H) 07/12/2018 0416   WBC 11 00 (H) 06/28/2018 1036       Meds:    Current Facility-Administered Medications:     acetaminophen (TYLENOL) tablet 975 mg, 975 mg, Oral, Q8H [address], Bunny Resendiz PA-C, 975 mg at 07/12/18 2078    aspirin chewable tablet 81 mg, 81 mg, Oral, BID With Meals, Sushila Contreras MD, 81 mg at 07/12/18 8242    celecoxib (CeleBREX) capsule 200 mg, 200 mg, Oral, BID, Bunny Resendiz PA-C, 200 mg at 07/12/18 8427    labetalol (NORMODYNE) injection 10 mg, 10 mg, Intravenous, Q4H PRN, Alexus Raymond DO    multivitamin-minerals (CENTRUM) tablet 1 tablet, 1 tablet, Oral, Daily, Sushila Contreras MD, 1 tablet at 07/12/18 4444    oxyCODONE (ROXICODONE) immediate release tablet 10 mg, 10 mg, Oral, Q4H PRN, Bunny Resendiz PA-C    oxyCODONE (ROXICODONE) IR tablet 5 mg, 5 mg, Oral, Q4H PRN, Bunny Resendiz PA-C, 5 mg at 07/12/18 0811    pregabalin (LYRICA) capsule 100 mg, 100 mg, Oral, BID, Bunny Resendiz PA-C, 100 mg at 07/12/18 0025    sodium chloride 0 9 % infusion, 100 mL/hr, Intravenous, Continuous, Alexus Raymond DO, Last Rate: 100 mL/hr at 07/11/18 2132, 100 mL/hr at 07/11/18 2132    traMADol (ULTRAM) tablet 50 mg, 50 mg, Oral, Q6H PRN, Bunny Resendiz PA-C    valsartan (DIOVAN) tablet 160 mg, 160 mg, Oral, Daily, Alexus Raymond DO, 160 mg at 07/12/18 9103    Blood Culture:   No results found for: BLOODCX    Wound Culture:   No results found for: WOUNDCULT    Ins and Outs:  I/O last 24 hours:   In: 0980 [P O :720; I V :2955]  Out: 900 [Urine:900]          Physical:  Vitals:    07/12/18 0758   BP: 140/85   Pulse: 93   Resp: 18   Temp: (!) 97 °F (36 1 °C)   SpO2: 99%     left lower extremity:  · Dressings C/D/I, no drainage or bleeding noted  · Toes warm and well perfused  · HVx1  · Left calf and thigh muscles are soft and supple     _*_*_*_*_*_*_*_*_*_*_*_*_*_*_*_*_*_*_*_*_*_*_*_*_*_*_*_*_*_*_*_*_*_*_*_*_*_*_*_*_*    Assessment: 52 y  o female post operative day 1   left total knee arthroplasty  Doing well  Will discharge home today      Plan:  · Weight Bearing as tolerated left lower extremity  · Rolling walker has been ordered for the patient  · Up and out of bed  · DVT prophylaxis, per orthopedic protocol  · Analgesics  · PT/OT as an outpatient after discharge starting on Monday  · Follow-up appointment in the office has already been arranged for August 1st  · Written paper scripts for postop meds were completed by Chana Poole PA-C yesterday    Latisha Duarte PA-C

## 2018-07-12 NOTE — PHYSICAL THERAPY NOTE
PT Treatment Note      07/12/18 0914   Pain Assessment   Pain Score Worst Possible Pain   Pain Type Surgical pain   Pain Location Knee   Pain Orientation Left   Restrictions/Precautions   Weight Bearing Precautions Per Order Yes   LLE Weight Bearing Per Order WBAT   Other Precautions (Chair Alarm;Multiple lines;Telemetry; Fall Risk;WBS)   General   Family/Caregiver Present No   Subjective   Subjective c/o pain L knee  c/o feeling dizzy when ambulating from bathroom to chair  (BP checked 135/76)   Bed Mobility   Supine to Sit 4  Minimal assistance   Additional items Assist x 1;HOB elevated; Bedrails; Increased time required;Verbal cues;LE management   Transfers   Sit to Stand 4  Minimal assistance   Additional items Assist x 1;Bedrails;Verbal cues   Stand to Sit 4  Minimal assistance   Additional items Assist x 1; Armrests; Verbal cues   Stand pivot (CGA)   Toilet transfer 4  Minimal assistance   Additional items Assist x 1;Verbal cues;Standard toilet  (rail)   Ambulation/Elevation   Gait pattern Antalgic;Decreased L stance; Short stride   Gait Assistance (CGA)   Additional items Assist x 1;Verbal cues   Assistive Device Rolling walker   Distance 15' x 2, 20' x 1   Balance   Static Sitting Good   Dynamic Sitting Good   Static Standing Fair   Dynamic Standing [address]  (with RW)   Endurance Deficit   Endurance Deficit Yes   Activity Tolerance   Activity Tolerance Patient limited by fatigue;Patient limited by pain   Assessment   Prognosis Good   Problem List Decreased strength;Decreased range of motion;Decreased endurance; Impaired balance;Decreased coordination;Decreased safety awareness;Orthopedic restrictions;Pain   Assessment Performing bed mobility, tx/ambulation at (min-CGA) x1-2  Increased pain and dizziness limiting mobility  Unable to perfom SLR, requires min  assist for LLE to tx OOB d/t pain and weakness  Provided RW for home and adjusted to proper height    Pt is in need of continued activity in PT to improve ROM pain strength balance endurance and all mobility including stair negotiation with return to (I) LOF  Plan   Treatment/Interventions Functional transfer training;LE strengthening/ROM; Therapeutic exercise; Endurance training;Bed mobility;Gait training   Progress Progressing toward goals   Recommendation   Recommendation Outpatient PT; Home PT   Pt  OOB with call bell within reach, scd's connected and turned on and alarm on at end of PT session

## 2018-07-12 NOTE — PROGRESS NOTES
Pt had c/o 9/10 pain this morning in her post operative hip  Oxycodone 5mg given as ordered  Pt had no relief and rates her pain 10/10  Oxycodone 10mg given as ordered for breakthrough pain  Pt states "It feels like deep bone pain " Will continue to monitor

## 2018-07-12 NOTE — CONSULTS
Consult- Alexus Lopez 1965, 52 y o  female MRN: [id number]    Unit/Bed#: 420-01 Encounter: [id number]    Primary Care Provider: Ananya Apodaca DO   Date and time admitted to hospital: 7/11/2018  6:23 AM      Consults    Leukocytosis   Assessment & Plan    -likely reactive in the setting of patient having surgery on 07/11/2008  -follow the CBC  -if leukocytosis persists, she will need an infection workup  -outpatient follow-up with her PCP in regards to this matter        Essential hypertension   Assessment & Plan    -continue valsartan  -hold hydrochlorothiazide and spironolactone with the patient being post-operative status  -follow the blood pressure trend  -p r n  IV labetalol        * Primary osteoarthritis of left knee   Assessment & Plan    -POD #1 left total knee arthroplasty  -PT/OT  -incentive spirometry 10 times per hour while awake  -aspirin 81 mg bid for DVT prophylaxis per Orthopedic Surgery  -pain control per Orthopedic Surgery                VTE Prophylaxis: Aspirin per Orthopedic Surgery/ sequential compression device     Recommendations for Discharge:  · Outpatient follow-up with her PCP to recheck a CBC in the setting of a leukocytosis and outpatient follow-up with her PCP for blood pressure management    Counseling / Coordination of Care Time: 30 minutes  Greater than 50% of total time spent on patient counseling and coordination of care  Collaboration of Care: Were Recommendations Directly Discussed with Primary Treatment Team? - No     History of Present Illness: Alexus Lopez is a 52 y o  female who is originally admitted to the service of Dr Matthew Iverson (Orthopedic Surgery) for an elective left total knee arthroplasty, which was completed on 07/11/2018  We are consulted for medical management  The patient complains of severe left knee pain rated as a 10/10 on the pain scale  No chest pain  No shortness of breath  No abdominal pain  No nausea or vomiting      Review of Systems:    Review of Systems:  Per HPI, all other systems have been reviewed and were negative  Past Medical and Surgical History:     Past Medical History:   Diagnosis Date    Arthritis     Hypertension        Past Surgical History:   Procedure Laterality Date    ABDOMINAL SURGERY       SECTION      FOOT SURGERY Right     bunionectomy    UTERINE SUSPENSION         Meds/Allergies:    all medications and allergies reviewed    Allergies: No Known Allergies    Social History:     Marital Status: Unknown    Substance Use History:   History   Alcohol Use No     History   Smoking Status    Never Smoker   Smokeless Tobacco    Never Used     Comment: Pt is a nonsmoker     History   Drug Use No       Family History:    non-contributory    Physical Exam:     Vitals:   Blood Pressure: 140/85 (18)  Pulse: 93 (18)  Temperature: (!) 97 °F (36 1 °C) (18)  Temp Source: Temporal (18)  Respirations: 18 (18)  Height: 5' 2" (157 5 cm) (1840)  Weight - Scale: 97 3 kg (214 lb 9 6 oz) (18)  SpO2: 99 % (18)    Physical Exam  General:  NAD, awake, alert, oriented x 3  HEENT:  NC/AT, mucous membranes moist  Neck:  Supple, No JVP elevation  CV:  + S1, + S2, RRR  Pulm:  Lung fields are CTA bilaterally  Abd:  Soft, Non-tender, Non-distended  Ext:  No clubbing/cyanosis, left knee with edema at the incision site S/P left total knee arthroplasty  Skin:  No rashes      Additional Data:     Lab Results: I have personally reviewed pertinent reports          Results from last 7 days  Lab Units 18  0416   WBC Thousand/uL 13 90*   HEMOGLOBIN g/dL 12 2   HEMATOCRIT % 36 5   PLATELETS Thousands/uL 188   LYMPHO PCT % 17   MONO PCT MAN % 6   EOSINO PCT MANUAL % 1       Results from last 7 days  Lab Units 18  0416   SODIUM mmol/L 138   POTASSIUM mmol/L 3 9   CHLORIDE mmol/L 103   CO2 mmol/L 25   BUN mg/dL 23   CREATININE mg/dL 0 87   CALCIUM mg/dL 8 6   TOTAL PROTEIN g/dL 5 9*   BILIRUBIN TOTAL mg/dL 0 50   ALK PHOS U/L 65   ALT U/L 52   AST U/L 20   GLUCOSE RANDOM mg/dL 135             No results found for: HGBA1C        Imaging: I have personally reviewed pertinent reports  No orders to display       EKG, Pathology, and Other Studies Reviewed on Admission:   · EKG (06/28/2018):  ECG 12 lead   Order: [id number]   Status:  Final result   Visible to patient:  No (Inaccessible in [address])   Next appt:  None    Ref Range & Units 6/28/18 1051   Ventricular Rate BPM 96    Atrial Rate BPM 96    NV Interval ms 146    QRSD Interval ms 84    QT Interval ms 356    QTC Interval ms 449    P Axis degrees 60    QRS Axis degrees 56    T Wave Axis degrees 74    Narrative     Normal sinus rhythm  Normal ECG  No previous ECGs available  Confirmed by [facility] ASHLEE MURPHY ([id number]) on 6/28/2018 7:49:45 PM      Specimen Collected: 06/28/18 10:51   Last Resulted: 06/28/18 19:49                  ** Please Note: This note has been constructed using a voice recognition system   **

## 2018-07-12 NOTE — ASSESSMENT & PLAN NOTE
-continue vasartan  -hold hydrochlorothiazide and spironolactone with the patient being postoperative status  -follow the blood pressure trend  -p r n  IV labetalol

## 2018-07-12 NOTE — PLAN OF CARE
Problem: Potential for Falls  Goal: Patient will remain free of falls  INTERVENTIONS:  - Assess patient frequently for physical needs  -  Identify cognitive and physical deficits and behaviors that affect risk of falls    -  [place] fall precautions as indicated by assessment   - Educate patient/family on patient safety including physical limitations  - Instruct patient to call for assistance with activity based on assessment  - Modify environment to reduce risk of injury  - Consider OT/PT consult to assist with strengthening/mobility   Outcome: Progressing      Problem: Prexisting or High Potential for Compromised Skin Integrity  Goal: Skin integrity is maintained or improved  INTERVENTIONS:  - Identify patients at risk for skin breakdown  - Assess and monitor skin integrity  - Assess and monitor nutrition and hydration status  - Monitor labs (i e  albumin)  - Assess for incontinence   - Turn and reposition patient  - Assist with mobility/ambulation  - Relieve pressure over bony prominences  - Avoid friction and shearing  - Provide appropriate hygiene as needed including keeping skin clean and dry  - Evaluate need for skin moisturizer/barrier cream  - Collaborate with interdisciplinary team (i e  Nutrition, Rehabilitation, etc )   - Patient/family teaching   Outcome: Progressing      Problem: PAIN - ADULT  Goal: Verbalizes/displays adequate comfort level or baseline comfort level  Interventions:  - Encourage patient to monitor pain and request assistance  - Assess pain using appropriate pain scale  - Administer analgesics based on type and severity of pain and evaluate response  - Implement non-pharmacological measures as appropriate and evaluate response  - Consider cultural and social influences on pain and pain management  - Notify physician/advanced practitioner if interventions unsuccessful or patient reports new pain   Outcome: Progressing      Problem: INFECTION - ADULT  Goal: Absence or prevention of progression during hospitalization  INTERVENTIONS:  - Assess and monitor for signs and symptoms of infection  - Monitor lab/diagnostic results  - Monitor all insertion sites, i e  indwelling lines, tubes, and drains  - Monitor endotracheal (as able) and nasal secretions for changes in amount and color  - [place] appropriate cooling/warming therapies per order  - Administer medications as ordered  - Instruct and encourage patient and family to use good hand hygiene technique  - Identify and instruct in appropriate isolation precautions for identified infection/condition   Outcome: Progressing    Goal: Absence of fever/infection during neutropenic period  INTERVENTIONS:  - Monitor WBC  - Implement neutropenic guidelines   Outcome: Progressing      Problem: SAFETY ADULT  Goal: Maintain or return to baseline ADL function  INTERVENTIONS:  -  Assess patient's ability to carry out ADLs; assess patient's baseline for ADL function and identify physical deficits which impact ability to perform ADLs (bathing, care of mouth/teeth, toileting, grooming, dressing, etc )  - Assess/evaluate cause of self-care deficits   - Assess range of motion  - Assess patient's mobility; develop plan if impaired  - Assess patient's need for assistive devices and provide as appropriate  - Encourage maximum independence but intervene and supervise when necessary  ¯ Involve family in performance of ADLs  ¯ Assess for home care needs following discharge   ¯ Request OT consult to assist with ADL evaluation and planning for discharge  ¯ Provide patient education as appropriate   Outcome: Progressing    Goal: Maintain or return mobility status to optimal level  INTERVENTIONS:  - Assess patient's baseline mobility status (ambulation, transfers, stairs, etc )    - Identify cognitive and physical deficits and behaviors that affect mobility  - Identify mobility aids required to assist with transfers and/or ambulation (gait belt, sit-to-stand, lift, walker, cane, etc )  - [place] fall precautions as indicated by assessment  - Record patient progress and toleration of activity level on Mobility SBAR; progress patient to next Phase/Stage  - Instruct patient to call for assistance with activity based on assessment  - Request Rehabilitation consult to assist with strengthening/weightbearing, etc    Outcome: Progressing      Problem: DISCHARGE PLANNING  Goal: Discharge to home or other facility with appropriate resources  INTERVENTIONS:  - Identify barriers to discharge w/patient and caregiver  - Arrange for needed discharge resources and transportation as appropriate  - Identify discharge learning needs (meds, wound care, etc )  - Arrange for interpretive services to assist at discharge as needed  - Refer to Case Management Department for coordinating discharge planning if the patient needs post-hospital services based on physician/advanced practitioner order or complex needs related to functional status, cognitive ability, or social support system   Outcome: Progressing      Problem: Knowledge Deficit  Goal: Patient/family/caregiver demonstrates understanding of disease process, treatment plan, medications, and discharge instructions  Complete learning assessment and assess knowledge base    Interventions:  - Provide teaching at level of understanding  - Provide teaching via preferred learning methods   Outcome: Progressing

## 2018-07-12 NOTE — ASSESSMENT & PLAN NOTE
-likely reactive in the setting of patient having surgery on 07/11/2008  -follow the CBC  -if leukocytosis persists, she will need an infection workup  -outpatient follow-up with her PCP in regards to this matter

## 2018-07-12 NOTE — ASSESSMENT & PLAN NOTE
-POD #1 left total knee arthroplasty  -PT/OT  -incentive spirometry 10 times per hour while awake  -aspirin 81 mg bid for DVT prophylaxis per Orthopedic Surgery  -pain control per Orthopedic Surgery

## 2018-07-13 ENCOUNTER — APPOINTMENT (INPATIENT)
Dept: ULTRASOUND IMAGING | Facility: HOSPITAL | Age: 53
DRG: 470 | End: 2018-07-13
Payer: COMMERCIAL

## 2018-07-13 ENCOUNTER — APPOINTMENT (INPATIENT)
Dept: RADIOLOGY | Facility: HOSPITAL | Age: 53
DRG: 470 | End: 2018-07-13
Payer: COMMERCIAL

## 2018-07-13 LAB
ANION GAP SERPL CALCULATED.3IONS-SCNC: 9 MMOL/L (ref 4–13)
BASOPHILS # BLD MANUAL: 0 THOUSAND/UL (ref 0–0.1)
BASOPHILS NFR MAR MANUAL: 0 % (ref 0–1)
BUN SERPL-MCNC: 22 MG/DL (ref 5–25)
CALCIUM SERPL-MCNC: 8.5 MG/DL (ref 8.3–10.1)
CHLORIDE SERPL-SCNC: 103 MMOL/L (ref 100–108)
CO2 SERPL-SCNC: 26 MMOL/L (ref 21–32)
CREAT SERPL-MCNC: 0.91 MG/DL (ref 0.6–1.3)
EOSINOPHIL # BLD MANUAL: 0.38 THOUSAND/UL (ref 0–0.4)
EOSINOPHIL NFR BLD MANUAL: 3 % (ref 0–6)
ERYTHROCYTE [DISTWIDTH] IN BLOOD BY AUTOMATED COUNT: 14.9 % (ref 11.6–15.1)
GFR SERPL CREATININE-BSD FRML MDRD: 71 ML/MIN/1.73SQ M
GLUCOSE SERPL-MCNC: 113 MG/DL (ref 65–140)
HCT VFR BLD AUTO: 37.8 % (ref 34.8–46.1)
HGB BLD-MCNC: 12.6 G/DL (ref 11.5–15.4)
LYMPHOCYTES # BLD AUTO: 19 % (ref 14–44)
LYMPHOCYTES # BLD AUTO: 2.41 THOUSAND/UL (ref 0.6–4.47)
MCH RBC QN AUTO: 27.8 PG (ref 26.8–34.3)
MCHC RBC AUTO-ENTMCNC: 33.3 G/DL (ref 31.4–37.4)
MCV RBC AUTO: 83 FL (ref 82–98)
MONOCYTES # BLD AUTO: 0.89 THOUSAND/UL (ref 0–1.22)
MONOCYTES NFR BLD: 7 % (ref 4–12)
NEUTROPHILS # BLD MANUAL: 8.99 THOUSAND/UL (ref 1.85–7.62)
NEUTS BAND NFR BLD MANUAL: 2 % (ref 0–8)
NEUTS SEG NFR BLD AUTO: 69 % (ref 43–75)
PLATELET # BLD AUTO: 203 THOUSANDS/UL (ref 149–390)
PLATELET BLD QL SMEAR: ADEQUATE
PMV BLD AUTO: 10.4 FL (ref 8.9–12.7)
POTASSIUM SERPL-SCNC: 4.2 MMOL/L (ref 3.5–5.3)
RBC # BLD AUTO: 4.54 MILLION/UL (ref 3.81–5.12)
RBC MORPH BLD: NORMAL
SODIUM SERPL-SCNC: 138 MMOL/L (ref 136–145)
TOTAL CELLS COUNTED SPEC: 100
WBC # BLD AUTO: 12.66 THOUSAND/UL (ref 4.31–10.16)

## 2018-07-13 PROCEDURE — 93970 EXTREMITY STUDY: CPT

## 2018-07-13 PROCEDURE — 97110 THERAPEUTIC EXERCISES: CPT | Performed by: PHYSICAL THERAPIST

## 2018-07-13 PROCEDURE — 93970 EXTREMITY STUDY: CPT | Performed by: SURGERY

## 2018-07-13 PROCEDURE — 85007 BL SMEAR W/DIFF WBC COUNT: CPT | Performed by: INTERNAL MEDICINE

## 2018-07-13 PROCEDURE — 85027 COMPLETE CBC AUTOMATED: CPT | Performed by: INTERNAL MEDICINE

## 2018-07-13 PROCEDURE — 97535 SELF CARE MNGMENT TRAINING: CPT

## 2018-07-13 PROCEDURE — 80048 BASIC METABOLIC PNL TOTAL CA: CPT | Performed by: PHYSICIAN ASSISTANT

## 2018-07-13 PROCEDURE — 97116 GAIT TRAINING THERAPY: CPT | Performed by: PHYSICAL THERAPIST

## 2018-07-13 PROCEDURE — 97530 THERAPEUTIC ACTIVITIES: CPT | Performed by: PHYSICAL THERAPIST

## 2018-07-13 PROCEDURE — 73560 X-RAY EXAM OF KNEE 1 OR 2: CPT

## 2018-07-13 PROCEDURE — 99232 SBSQ HOSP IP/OBS MODERATE 35: CPT | Performed by: INTERNAL MEDICINE

## 2018-07-13 RX ORDER — MORPHINE SULFATE 2 MG/ML
2 INJECTION, SOLUTION INTRAMUSCULAR; INTRAVENOUS ONCE
Status: COMPLETED | OUTPATIENT
Start: 2018-07-13 | End: 2018-07-13

## 2018-07-13 RX ORDER — OXYCODONE HYDROCHLORIDE 10 MG/1
10 TABLET ORAL EVERY 4 HOURS PRN
Status: DISCONTINUED | OUTPATIENT
Start: 2018-07-13 | End: 2018-07-14 | Stop reason: HOSPADM

## 2018-07-13 RX ORDER — ACETAMINOPHEN 325 MG/1
650 TABLET ORAL EVERY 6 HOURS PRN
Status: DISCONTINUED | OUTPATIENT
Start: 2018-07-13 | End: 2018-07-14 | Stop reason: HOSPADM

## 2018-07-13 RX ADMIN — MORPHINE SULFATE 2 MG: 2 INJECTION, SOLUTION INTRAMUSCULAR; INTRAVENOUS at 01:19

## 2018-07-13 RX ADMIN — MULTIPLE VITAMINS W/ MINERALS TAB 1 TABLET: TAB at 09:16

## 2018-07-13 RX ADMIN — LABETALOL 20 MG/4 ML (5 MG/ML) INTRAVENOUS SYRINGE 10 MG: at 12:51

## 2018-07-13 RX ADMIN — ACETAMINOPHEN 975 MG: 325 TABLET ORAL at 06:08

## 2018-07-13 RX ADMIN — PREGABALIN 100 MG: 50 CAPSULE ORAL at 18:06

## 2018-07-13 RX ADMIN — OXYCODONE HYDROCHLORIDE 5 MG: 5 TABLET ORAL at 06:08

## 2018-07-13 RX ADMIN — OXYCODONE HYDROCHLORIDE 10 MG: 10 TABLET ORAL at 12:50

## 2018-07-13 RX ADMIN — ASPIRIN 81 MG 81 MG: 81 TABLET ORAL at 16:11

## 2018-07-13 RX ADMIN — VALSARTAN 160 MG: 80 TABLET, FILM COATED ORAL at 09:16

## 2018-07-13 RX ADMIN — HYDROMORPHONE HYDROCHLORIDE 1 MG: 1 INJECTION, SOLUTION INTRAMUSCULAR; INTRAVENOUS; SUBCUTANEOUS at 09:17

## 2018-07-13 RX ADMIN — ASPIRIN 81 MG 81 MG: 81 TABLET ORAL at 09:16

## 2018-07-13 RX ADMIN — CELECOXIB 200 MG: 200 CAPSULE ORAL at 18:05

## 2018-07-13 RX ADMIN — CELECOXIB 200 MG: 200 CAPSULE ORAL at 09:16

## 2018-07-13 RX ADMIN — OXYCODONE HYDROCHLORIDE 10 MG: 10 TABLET ORAL at 18:05

## 2018-07-13 RX ADMIN — PREGABALIN 100 MG: 50 CAPSULE ORAL at 09:16

## 2018-07-13 RX ADMIN — HYDROMORPHONE HYDROCHLORIDE 1 MG: 1 INJECTION, SOLUTION INTRAMUSCULAR; INTRAVENOUS; SUBCUTANEOUS at 16:11

## 2018-07-13 NOTE — PROGRESS NOTES
Pt expressing 10/10 pain and is very anxious and upset about her  L knee pain, patient expressing that current pain regimen is not working  Pt has positive pedal pulses, brisk capillary refill and the site is warm to touch  Pt  VSS  The answering service was called and they attempted to page the PA on call for the patient  The answering service expressed that the number they had did not answer at this time but they did leave a voicemail and the contact for the hospital to call back  ERIK Woody notified of this and 2mg IV morphine x1dose ordered to give at this time  Will continue to monitor

## 2018-07-13 NOTE — PHYSICAL THERAPY NOTE
PT Treatment     07/13/18 1501   Pain Assessment   Pain Score 7   Pain Location Knee   Pain Orientation Left   Restrictions/Precautions   LUE Weight Bearing Per Order WBAT   Subjective   Subjective Pt notes pain is more tolerable with the dilaudid and oxy combination  Pt still notes pain is 7/10  Bed Mobility   Additional Comments pt OOB in bathroom when PT entered room  Pt able to perform toilet transfer and manage clothing with Supervision  Transfers   Sit to Stand 5  Supervision   Additional items (grab bar and RW)   Stand to Sit 5  Supervision   Additional items Verbal cues  (pt still requiring verbal cues for safety and technique w/RW)   Stand pivot (CGA with RW)   Additional Comments Pt with increased ambulation distance this pm due to pt noting improvement in pain  Pt able to ascend/descend 4 steps however unable to negotiate additional stairs and requires min(A) which pt has 13 at home  Ambulation/Elevation   Gait pattern Step to; Antalgic;Decreased L stance   Gait Assistance (CGA)   Assistive Device Rolling walker   Distance 140ft with RW CGA with verbal cues for safety and proper use of RW to maintain L foot inside walker      Stair Management Assistance 4  Minimal assist   Additional items Assist x 1   Stair Management Technique One rail L;Step to pattern;Nonreciprocal;With gait belt   Number of Stairs 4   Balance   Static Sitting Good   Dynamic Sitting Good   Static Standing Fair +  (with RW)   Dynamic Standing Fair  (with RW)   Ambulatory Fair  (with RW)   Endurance Deficit   Endurance Deficit Yes   Endurance Deficit Description limited activity tolerance due to pain   Activity Tolerance   Activity Tolerance Patient limited by fatigue;Patient limited by pain   Assessment   Prognosis Good   Problem List Decreased strength;Decreased range of motion;Decreased endurance;Decreased mobility;Pain;Orthopedic restrictions;Decreased safety awareness   Assessment Pt still requiring verbal cues for safety during transfers and ambulation  Pt able to ascend/descend 4 steps with HR requiring min(A) for safety  Pt seated in chair at bedside with call bell in reach and LE's elevated chair alarm on  Plan   Treatment/Interventions ADL retraining;Functional transfer training;Elevations; Endurance training;Patient/family training;Bed mobility;Gait training   Progress Slow progress, decreased activity tolerance   Recommendation   Recommendation Short-term skilled PT; Home PT   PT - OK to Discharge Yes  (when medically stable for discharge)   Pt with SCD's on when PT entered room  SCD's reapplied and turned on with pt seated in chair at bedside with call bell in reach and chair alarm on

## 2018-07-13 NOTE — SOCIAL WORK
Sally Benitez [facility] chair leyla to set up transport for tomorrow  They are pretty sure that they can not transport the pt but are unsure when they can transport her  They will call the time they will  transport her

## 2018-07-13 NOTE — PLAN OF CARE
Problem: Potential for Falls  Goal: Patient will remain free of falls  INTERVENTIONS:  - Assess patient frequently for physical needs  -  Identify cognitive and physical deficits and behaviors that affect risk of falls  -  [place] fall precautions as indicated by assessment   High fall risk    - Educate patient/family on patient safety including physical limitations  - Instruct patient to call for assistance with activity based on assessment  - Modify environment to reduce risk of injury  - Consider OT/PT consult to assist with strengthening/mobility   Outcome: Progressing      Problem: Prexisting or High Potential for Compromised Skin Integrity  Goal: Skin integrity is maintained or improved  INTERVENTIONS:  - Identify patients at risk for skin breakdown  - Assess and monitor skin integrity  - Assess and monitor nutrition and hydration status  - Monitor labs (i e  albumin)  - Assess for incontinence   - Turn and reposition patient  - Assist with mobility/ambulation  - Relieve pressure over bony prominences  - Avoid friction and shearing  - Provide appropriate hygiene as needed including keeping skin clean and dry  - Evaluate need for skin moisturizer/barrier cream  - Collaborate with interdisciplinary team (i e  Nutrition, Rehabilitation, etc )   - Patient/family teaching   Outcome: Progressing      Problem: PAIN - ADULT  Goal: Verbalizes/displays adequate comfort level or baseline comfort level  Interventions:  - Encourage patient to monitor pain and request assistance  - Assess pain using appropriate pain scale  - Administer analgesics based on type and severity of pain and evaluate response  - Implement non-pharmacological measures as appropriate and evaluate response  - Consider cultural and social influences on pain and pain management  - Notify physician/advanced practitioner if interventions unsuccessful or patient reports new pain   Outcome: Progressing      Problem: INFECTION - ADULT  Goal: Absence or prevention of progression during hospitalization  INTERVENTIONS:  - Assess and monitor for signs and symptoms of infection  - Monitor lab/diagnostic results  - Monitor all insertion sites, i e  indwelling lines, tubes, and drains  - [place] appropriate cooling/warming therapies per order  - Administer medications as ordered  - Instruct and encourage patient and family to use good hand hygiene technique  - Identify and instruct in appropriate isolation precautions for identified infection/condition   Outcome: Progressing    Goal: Absence of fever/infection during neutropenic period  INTERVENTIONS:  - Monitor WBC  Outcome: Progressing      Problem: SAFETY ADULT  Goal: Maintain or return to baseline ADL function  INTERVENTIONS:  -  Assess patient's ability to carry out ADLs; assess patient's baseline for ADL function and identify physical deficits which impact ability to perform ADLs (bathing, care of mouth/teeth, toileting, grooming, dressing, etc )  - Assess/evaluate cause of self-care deficits   - Assess range of motion  - Assess patient's mobility; develop plan if impaired  - Assess patient's need for assistive devices and provide as appropriate  - Encourage maximum independence but intervene and supervise when necessary  ¯ Involve family in performance of ADLs  ¯ Assess for home care needs following discharge   ¯ Request OT consult to assist with ADL evaluation and planning for discharge  ¯ Provide patient education as appropriate   Outcome: Progressing    Goal: Maintain or return mobility status to optimal level  INTERVENTIONS:  - Assess patient's baseline mobility status (ambulation, transfers, stairs, etc )    - Identify cognitive and physical deficits and behaviors that affect mobility  - Identify mobility aids required to assist with transfers and/or ambulation (gait belt, sit-to-stand, lift, walker, cane, etc )  - [place] fall precautions as indicated by assessment  - Record patient progress and toleration of activity level on Mobility SBAR; progress patient to next Phase/Stage  - Instruct patient to call for assistance with activity based on assessment  - Request Rehabilitation consult to assist with strengthening/weightbearing, etc    Outcome: Progressing      Problem: DISCHARGE PLANNING  Goal: Discharge to home or other facility with appropriate resources  INTERVENTIONS:  - Identify barriers to discharge w/patient and caregiver  - Arrange for needed discharge resources and transportation as appropriate  - Identify discharge learning needs (meds, wound care, etc )  - Arrange for interpretive services to assist at discharge as needed  - Refer to Case Management Department for coordinating discharge planning if the patient needs post-hospital services based on physician/advanced practitioner order or complex needs related to functional status, cognitive ability, or social support system   Outcome: Progressing      Problem: Knowledge Deficit  Goal: Patient/family/caregiver demonstrates understanding of disease process, treatment plan, medications, and discharge instructions  Complete learning assessment and assess knowledge base    Interventions:  - Provide teaching at level of understanding  - Provide teaching via preferred learning methods   Outcome: Progressing

## 2018-07-13 NOTE — PROGRESS NOTES
Progress Note - Orthopedics   Gordy Fleming 52 y o  female MRN: [id number]  Unit/Bed#: 420-01    Subjective:  POD 2  S/P left total knee arthroplasty  Patient continues with pain in her left knee  The knee is more swollen today than yesterday  The patient was doing fine and actually was going to be discharged home yesterday  She started with worsening pain particularly in the medial left knee  She pointed to her medial tibial plateau area as the worst area for her discomfort  This was provoked after she started physical therapy yesterday morning  Patient was requiring IV narcotic pain medications  She was to the point that she was crying because of discomfort and related her pain as a 10 on a 10 point pain scale throughout the day  The patient was started on additional IV morphine by the hospitalist team and she had been sleeping for most of the day  Last evening the patient was experiencing more pain in her left knee and the physician assistant from Dr Marlen Macdonald group was contacted overnight by telephone by the patient's RN  The patient is requiring continuous IV narcotic medication for pain control  Personally discussed with the consulting hospitalist physician  Dr [place] would like to order a duplex scan of left leg to rule out DVT  Physical Exam:  BP (!) 182/90 (BP Location: Left arm)   Pulse (!) 120   Temp 98 °F (36 7 °C) (Temporal)   Resp 20   Ht 5' 2" (1 575 m)   Wt 97 3 kg (214 lb 9 6 oz)   SpO2 95%   Breastfeeding? No   BMI 39 25 kg/m²     The patient appears groggy  She is lying supine in bed  She appears uncomfortable but in no acute distress  Left LE:  Left knee is more swollen today than yesterday  She is tender over the medial tibial plateau  No bruising is noted  She has moderate left knee effusion present  There is no surgical drain  Mepilex dressing is clean and dry  There is no drainage or bleeding seen    SILT L1/S1  +motor EHL/FHL  2+ DP pulse  Calf and thigh muscles are soft  She moves the toes well       0  Lab Value Date/Time   HCT 37 8 07/13/2018 0555   HGB 12 6 07/13/2018 0555   INR 0 93 06/28/2018 1036   WBC 12 66 (H) 07/13/2018 0555       Current Facility-Administered Medications:  acetaminophen 650 mg Oral Q6H PRN [employer], DO   aspirin 81 mg Oral BID With Meals Xander Dove MD   celecoxib 200 mg Oral BID Ethan Stein PA-C   HYDROmorphone 1 mg Intravenous Q3H PRN [employer], DO   labetalol 10 mg Intravenous Q4H PRN [employer], DO   multivitamin-minerals 1 tablet Oral Daily Xander Dove MD   oxyCODONE 10 mg Oral Q4H PRN [employer], DO   pregabalin 100 mg Oral BID Ethan Stein PA-C   valsartan 160 mg Oral Daily [employer], DO       Assessment:  POD   S/P left knee arthroplasty  Patient continues with left knee intractable pain despite IV narcotic pain medications  Plan:  Personally discussed with Alex Rodriges from Dr Mary Martinez office over the telephone this morning and JAMISON Weiner via telephone  Dr Chayo Ramirez will personally see the patient early this afternoon to determine disposition  All communication from the hospitalist team or nursing staff should be directed to Dr Mary Martinez office        Roc Trivedi PA-C

## 2018-07-13 NOTE — SOCIAL WORK
Pt is now agreeable to going to short term rehab  Pt would like to go to an acute rehab facility  Pt would like me to send a referral [facility] or [facility]  Referrals were made to both facilities  Started authorization from [employer] for pt to go facility

## 2018-07-13 NOTE — PROGRESS NOTES
JAMISON VALENCIA returned page and the patient's expressed pain and situation was explained  Pt  Is noted to be asleep at the moment and is currently in no distress at this time  Current pain regimen kept the same with no further orders at this time

## 2018-07-13 NOTE — PHYSICAL THERAPY NOTE
PT Treatment     07/13/18 1028   Pain Assessment   Pain Score 7   Pain Type Acute pain   Pain Location Knee   Pain Orientation Left   Restrictions/Precautions   LUE Weight Bearing Per Order WBAT   Subjective   Subjective Pt continues to complain of increased swelling and pain L knee limiting mobility  Pt notes skin feels tight and knee feels like its going to pop  Bed Mobility   Sit to Supine 4  Minimal assistance   Additional items Assist x 1;LE management;Verbal cues; Bedrails   Additional Comments pt with limited SLR L LE and limited knee flex to 65 degrees   Transfers   Sit to Stand (CGA with RW)   Additional items Verbal cues;Armrests   Stand to Sit (CGA with RW)   Additional items Verbal cues  (with RW)   Stand pivot (CGA with RW)   Toilet transfer 4  Minimal assistance   Additional items Assist x 1;Standard toilet  (with grab bar and RW)   Additional Comments Pt limited by pain and swelling L knee with step to gait pattern and decreased weightbearing tolerance L LE  Ambulation limited by pain and pt feeling clamy and dizzy  /67  and chair required for pt to sit after 24ft of ambulation  Ambulation/Elevation   Gait pattern Step to;Decreased L stance; Antalgic   Gait Assistance (CGA)   Assistive Device Rolling walker   Distance 25ft with RW CGA limited by pain lightheadedness and dizziness   Balance   Static Sitting Good   Dynamic Sitting Good   Static Standing Fair  (with RW)   Dynamic Standing Fair  (with RW)   Ambulatory Fair  (with RW)   Endurance Deficit   Endurance Deficit Yes   Endurance Deficit Description limited activity tolerance due to pain and swelling   Activity Tolerance   Activity Tolerance Patient limited by fatigue;Patient limited by pain   Exercises   Quad Sets Supine;15 reps;Bilateral;AROM   Hip Adduction Supine;15 reps;Bilateral;AROM   Ankle Pumps Sitting;20 reps;Bilateral;AROM   Assessment   Problem List Decreased strength;Decreased range of motion;Decreased endurance; Impaired balance;Decreased mobility;Pain;Orthopedic restrictions   Assessment Pt tolerated ankle pumps quad sets and adductor squeezes well  Pt still with limited ambulation tolerance with step to gait pattern and dizziness and claminess requiring seated rest  Pt also requiring assistance with toilet transfers and continues to demonstrate limited SLR and limited knee flex  Pt defered remaining OOB in chair and returned to supine in bed with call bell in reach and alarm on  Plan   Treatment/Interventions Functional transfer training;LE strengthening/ROM; Therapeutic exercise; Endurance training;Patient/family training;Bed mobility;Gait training   Progress Slow progress, decreased activity tolerance   Recommendation   Recommendation Short-term skilled PT; Home PT  (based on progress and pain control)   PT - OK to Discharge No   Pt with SCD's on when PT entered room  SCD's reapplied and turned on with pt supine in bed with call bell in reach and bed alarm on

## 2018-07-13 NOTE — ASSESSMENT & PLAN NOTE
-POD #2 left total knee arthroplasty  -PT/OT  -incentive spirometry 10 times per hour while awake  -aspirin 81 mg bid for DVT prophylaxis per Orthopedic Surgery  -the patient continues to have severe left knee pain requiring multiple doses of IV pain medications  -pain control per Orthopedic Surgery  -check a venous duplex of the bilateral lower extremities

## 2018-07-13 NOTE — ASSESSMENT & PLAN NOTE
-the patient's elevated blood pressure readings are likely secondary to her left knee pain  -continue valsartan  -hold hydrochlorothiazide and spironolactone with the patient being post-operative status  -follow the blood pressure trend  -p r n  IV labetalol

## 2018-07-13 NOTE — ASSESSMENT & PLAN NOTE
-likely reactive in the setting of patient having surgery on 07/11/2008  -the leukocytosis is improving  -follow the CBC  -if leukocytosis persists, she will need an infection workup and possibly an outpatient Hematology evaluation  -outpatient follow-up with her PCP in regards to this matter

## 2018-07-13 NOTE — SOCIAL WORK
[facility] accepted the pt for Saturday July 14, 2018  We will await for insurance determination

## 2018-07-13 NOTE — OCCUPATIONAL THERAPY NOTE
OT Note     07/13/18 1003   Restrictions/Precautions   Weight Bearing Precautions Per Order Yes   LUE Weight Bearing Per Order WBAT   ADL   Where Assessed (Raised toilet with rails)   Grooming Assistance 5  Supervision/Setup   UB Bathing Assistance 5  Supervision/Setup   UB Bathing Comments A with back   LB Bathing Assistance 4  Minimal Assistance   LB Bathing Deficit Buttocks   LB Bathing Comments Completes kathryn in stance, A with buttocks, declines LEs secondary pain   UB Dressing Assistance 4  Minimal Assistance   UB Dressing Comments A with fasteners   LB Dressing Assistance 2  Maximal Assistance   LB Dressing Deficit Thread RLE into underwear; Thread LLE into underwear;Don/doff R sock; Don/doff L sock   LB Dressing Comments C/O unable secondary discomfort   Bed Mobility   Supine to Sit 4  Minimal assistance   Additional items LE management   Transfers   Sit to Stand 4  Minimal assistance   Additional items Assist x 1;Verbal cues   Stand to Sit 4  Minimal assistance   Additional items Assist x 1  (Grab bars in BR)   Functional Mobility   Functional Mobility 4  Minimal assistance   Additional Comments Completes txfr EOB to toilet in BR with CG   Additional items Rolling walker   Toilet Transfers   Toilet Transfer From Bed   Toilet Transfer Type To and from   Toilet Transfer to Raised toilet seat with rails   Toilet Transfer Technique Ambulating   Toilet Transfers Contact guard   Toilet Transfers Comments Cues for safety   Activity Tolerance   Activity Tolerance Patient limited by pain   Assessment   Assessment Presents supine, agreeable to participate with encouragement  Requests to be toileted  Txfred EOB to toilet in BR, set up all items a m  self care while seated same  Completes self care as per above with continued c/o discomfort LLE  Refer PT note for additional details func mobility and return supine     Recommendation   Recommendation (Home vs STR depending on progress )

## 2018-07-13 NOTE — SOCIAL WORK
Pt was approved by [employer]  To [facility] to [facility]   Pt is approved for 5 days   The authorization number is [id number]  The NRD is Due 7/18/18   Send authorization to  [facility] [id number]

## 2018-07-13 NOTE — PROGRESS NOTES
Progress Note - Elizabeth Urbina 1965, 52 y o  female MRN: [id number]    Unit/Bed#: 420-01 Encounter: [id number]    Primary Care Provider: Cely Wang DO   Date and time admitted to hospital: 7/11/2018  6:23 AM        Leukocytosis   Assessment & Plan    -likely reactive in the setting of patient having surgery on 07/11/2008  -the leukocytosis is improving  -follow the CBC  -if leukocytosis persists, she will need an infection workup and possibly an outpatient Hematology evaluation  -outpatient follow-up with her PCP in regards to this matter        Essential hypertension   Assessment & Plan    -the patient's elevated blood pressure readings are likely secondary to her left knee pain  -continue valsartan  -hold hydrochlorothiazide and spironolactone with the patient being post-operative status  -follow the blood pressure trend  -p r n  IV labetalol        * Primary osteoarthritis of left knee   Assessment & Plan    -POD #2 left total knee arthroplasty  -PT/OT  -incentive spirometry 10 times per hour while awake  -aspirin 81 mg bid for DVT prophylaxis per Orthopedic Surgery  -the patient continues to have severe left knee pain requiring multiple doses of IV pain medications  -pain control per Orthopedic Surgery  -check a venous duplex of the bilateral lower extremities            VTE Pharmacologic Prophylaxis:   Pharmacologic: Aspirin per Orthopedic Surgery  Mechanical VTE Prophylaxis in Place: Yes    Patient Centered Rounds: I have performed bedside rounds with nursing staff today  Time Spent for Care: 20 minutes  More than 50% of total time spent on counseling and coordination of care as described above  Current Length of Stay: 2 day(s)    Current Patient Status: Inpatient   Certification Statement: The patient will continue to require additional inpatient hospital stay due to intractable left knee pain requiring multiple doses of IV pain medications for pain control        Code Status: Level 1 - Full Code      Subjective: The patient was seen and examined  The patient complains of severe left knee pain  No chest pain  No shortness of breath  Objective:     Vitals:   Temp (24hrs), Av 8 °F (36 6 °C), Min:97 2 °F (36 2 °C), Max:98 3 °F (36 8 °C)    HR:  [] 120  Resp:  [16-20] 20  BP: (138-191)/(74-91) 191/86  SpO2:  [95 %-99 %] 95 %  Body mass index is 39 25 kg/m²  Input and Output Summary (last 24 hours): Intake/Output Summary (Last 24 hours) at 18 1120  Last data filed at 18 0916   Gross per 24 hour   Intake              240 ml   Output              600 ml   Net             -360 ml       Physical Exam:     Physical Exam  General:  NAD, awake, alert, oriented x 3  HEENT:  NC/AT, mucous membranes moist  Neck:  Supple, No JVP elevation  CV:  + S1, + S2, Tachycardic, Regular rhythm  Pulm:  Lung fields are CTA bilaterally  Abd:  Soft, Non-tender, Non-distended  Ext:  No clubbing/cyanosis, + left knee edema  Skin:  No rashes      Additional Data:     Labs:      Results from last 7 days  Lab Units 18  0555   WBC Thousand/uL 12 66*   HEMOGLOBIN g/dL 12 6   HEMATOCRIT % 37 8   PLATELETS Thousands/uL 203   LYMPHO PCT % 19   MONO PCT MAN % 7   EOSINO PCT MANUAL % 3       Results from last 7 days  Lab Units 18  0555 18  0416   SODIUM mmol/L 138 138   POTASSIUM mmol/L 4 2 3 9   CHLORIDE mmol/L 103 103   CO2 mmol/L 26 25   BUN mg/dL 22 23   CREATININE mg/dL 0 91 0 87   CALCIUM mg/dL 8 5 8 6   TOTAL PROTEIN g/dL  --  5 9*   BILIRUBIN TOTAL mg/dL  --  0 50   ALK PHOS U/L  --  65   ALT U/L  --  52   AST U/L  --  20   GLUCOSE RANDOM mg/dL 113 135                     * I Have Reviewed All Lab Data Listed Above  * Additional Pertinent Lab Tests Reviewed:  Gabrielle Mccoy Admission Reviewed      Recent Cultures (last 7 days):           Last 24 Hours Medication List:     Current Facility-Administered Medications:  acetaminophen 650 mg Oral Q6H PRN Arvid Reef Cam,    aspirin 81 mg Oral BID With Meals Triston Sheldon MD   celecoxib 200 mg Oral BID Nayla Skinner PA-C   HYDROmorphone 1 mg Intravenous Q3H PRN Harish Sigala,    labetalol 10 mg Intravenous Q4H PRN Harish Sigala,    multivitamin-minerals 1 tablet Oral Daily Triston Sheldon MD   oxyCODONE 10 mg Oral Q4H PRN Harish Sigala,    pregabalin 100 mg Oral BID Nayla Skinner PA-C   valsartan 160 mg Oral Daily Harish Sigala,         Today, Patient Was Seen By: Harish Sigala DO    ** Please Note: Dictation voice to text software may have been used in the creation of this document   **

## 2018-07-14 ENCOUNTER — HOSPITAL ENCOUNTER (INPATIENT)
Facility: HOSPITAL | Age: 53
LOS: 11 days | Discharge: HOME | DRG: 561 | End: 2018-07-25
Attending: PHYSICAL MEDICINE & REHABILITATION | Admitting: PHYSICAL MEDICINE & REHABILITATION
Payer: COMMERCIAL

## 2018-07-14 VITALS
OXYGEN SATURATION: 98 % | WEIGHT: 214.6 LBS | BODY MASS INDEX: 39.49 KG/M2 | DIASTOLIC BLOOD PRESSURE: 78 MMHG | SYSTOLIC BLOOD PRESSURE: 134 MMHG | RESPIRATION RATE: 16 BRPM | HEIGHT: 62 IN | HEART RATE: 106 BPM | TEMPERATURE: 98.6 F

## 2018-07-14 DIAGNOSIS — M17.12 PRIMARY OSTEOARTHRITIS OF LEFT KNEE: Primary | Chronic | ICD-10-CM

## 2018-07-14 DIAGNOSIS — I10 ESSENTIAL HYPERTENSION: ICD-10-CM

## 2018-07-14 LAB
ALBUMIN SERPL BCP-MCNC: 2.4 G/DL (ref 3.5–5)
ALP SERPL-CCNC: 76 U/L (ref 46–116)
ALT SERPL W P-5'-P-CCNC: 81 U/L (ref 12–78)
ANION GAP SERPL CALCULATED.3IONS-SCNC: 8 MMOL/L (ref 4–13)
AST SERPL W P-5'-P-CCNC: 43 U/L (ref 5–45)
BASOPHILS # BLD MANUAL: 0 THOUSAND/UL (ref 0–0.1)
BASOPHILS NFR MAR MANUAL: 0 % (ref 0–1)
BILIRUB SERPL-MCNC: 0.7 MG/DL (ref 0.2–1)
BUN SERPL-MCNC: 18 MG/DL (ref 5–25)
CALCIUM SERPL-MCNC: 8.5 MG/DL (ref 8.3–10.1)
CHLORIDE SERPL-SCNC: 102 MMOL/L (ref 100–108)
CK SERPL-CCNC: 78 U/L (ref 26–192)
CO2 SERPL-SCNC: 27 MMOL/L (ref 21–32)
CREAT SERPL-MCNC: 0.8 MG/DL (ref 0.6–1.3)
EOSINOPHIL # BLD MANUAL: 0.22 THOUSAND/UL (ref 0–0.4)
EOSINOPHIL NFR BLD MANUAL: 2 % (ref 0–6)
ERYTHROCYTE [DISTWIDTH] IN BLOOD BY AUTOMATED COUNT: 14.8 % (ref 11.6–15.1)
GFR SERPL CREATININE-BSD FRML MDRD: 83 ML/MIN/1.73SQ M
GLUCOSE SERPL-MCNC: 126 MG/DL (ref 65–140)
HCT VFR BLD AUTO: 37.3 % (ref 34.8–46.1)
HGB BLD-MCNC: 12.3 G/DL (ref 11.5–15.4)
HYPERCHROMIA BLD QL SMEAR: PRESENT
LACTATE SERPL-SCNC: 1.3 MMOL/L (ref 0.5–2)
LYMPHOCYTES # BLD AUTO: 2.58 THOUSAND/UL (ref 0.6–4.47)
LYMPHOCYTES # BLD AUTO: 23 % (ref 14–44)
MAGNESIUM SERPL-MCNC: 2.2 MG/DL (ref 1.6–2.6)
MCH RBC QN AUTO: 27.5 PG (ref 26.8–34.3)
MCHC RBC AUTO-ENTMCNC: 33 G/DL (ref 31.4–37.4)
MCV RBC AUTO: 83 FL (ref 82–98)
MONOCYTES # BLD AUTO: 0.9 THOUSAND/UL (ref 0–1.22)
MONOCYTES NFR BLD: 8 % (ref 4–12)
NEUTROPHILS # BLD MANUAL: 7.52 THOUSAND/UL (ref 1.85–7.62)
NEUTS SEG NFR BLD AUTO: 67 % (ref 43–75)
PHOSPHATE SERPL-MCNC: 3.5 MG/DL (ref 2.7–4.5)
PLATELET # BLD AUTO: 209 THOUSANDS/UL (ref 149–390)
PLATELET BLD QL SMEAR: ADEQUATE
PMV BLD AUTO: 10.2 FL (ref 8.9–12.7)
POTASSIUM SERPL-SCNC: 4 MMOL/L (ref 3.5–5.3)
PROCALCITONIN SERPL-MCNC: 0.17 NG/ML
PROT SERPL-MCNC: 6.3 G/DL (ref 6.4–8.2)
RBC # BLD AUTO: 4.48 MILLION/UL (ref 3.81–5.12)
SODIUM SERPL-SCNC: 137 MMOL/L (ref 136–145)
TOTAL CELLS COUNTED SPEC: 100
WBC # BLD AUTO: 11.22 THOUSAND/UL (ref 4.31–10.16)

## 2018-07-14 PROCEDURE — 85027 COMPLETE CBC AUTOMATED: CPT | Performed by: INTERNAL MEDICINE

## 2018-07-14 PROCEDURE — 80053 COMPREHEN METABOLIC PANEL: CPT | Performed by: INTERNAL MEDICINE

## 2018-07-14 PROCEDURE — 83735 ASSAY OF MAGNESIUM: CPT | Performed by: INTERNAL MEDICINE

## 2018-07-14 PROCEDURE — 84145 PROCALCITONIN (PCT): CPT | Performed by: INTERNAL MEDICINE

## 2018-07-14 PROCEDURE — 82550 ASSAY OF CK (CPK): CPT | Performed by: INTERNAL MEDICINE

## 2018-07-14 PROCEDURE — 99253 IP/OBS CNSLTJ NEW/EST LOW 45: CPT | Performed by: HOSPITALIST

## 2018-07-14 PROCEDURE — 85007 BL SMEAR W/DIFF WBC COUNT: CPT | Performed by: INTERNAL MEDICINE

## 2018-07-14 PROCEDURE — 83605 ASSAY OF LACTIC ACID: CPT | Performed by: INTERNAL MEDICINE

## 2018-07-14 PROCEDURE — 84100 ASSAY OF PHOSPHORUS: CPT | Performed by: INTERNAL MEDICINE

## 2018-07-14 RX ORDER — ASPIRIN 81 MG/1
81 TABLET, CHEWABLE ORAL 2 TIMES DAILY WITH MEALS
Status: DISCONTINUED | OUTPATIENT
Start: 2018-07-14 | End: 2018-07-25 | Stop reason: HOSPADM

## 2018-07-14 RX ORDER — OXYCODONE HYDROCHLORIDE 5 MG/1
10 TABLET ORAL EVERY 6 HOURS PRN
Status: DISCONTINUED | OUTPATIENT
Start: 2018-07-14 | End: 2018-07-14

## 2018-07-14 RX ORDER — DOCUSATE SODIUM 100 MG/1
100 CAPSULE, LIQUID FILLED ORAL 2 TIMES DAILY
Status: DISCONTINUED | OUTPATIENT
Start: 2018-07-14 | End: 2018-07-25 | Stop reason: HOSPADM

## 2018-07-14 RX ORDER — FAMOTIDINE 20 MG/1
20 TABLET, FILM COATED ORAL DAILY
Status: DISCONTINUED | OUTPATIENT
Start: 2018-07-14 | End: 2018-07-25 | Stop reason: HOSPADM

## 2018-07-14 RX ORDER — DOCUSATE SODIUM 100 MG/1
100 CAPSULE, LIQUID FILLED ORAL 2 TIMES DAILY PRN
Status: DISCONTINUED | OUTPATIENT
Start: 2018-07-14 | End: 2018-07-25 | Stop reason: HOSPADM

## 2018-07-14 RX ORDER — TRAMADOL HYDROCHLORIDE 50 MG/1
50 TABLET ORAL EVERY 6 HOURS PRN
Status: ACTIVE | OUTPATIENT
Start: 2018-07-14 | End: 2018-07-21

## 2018-07-14 RX ORDER — ONDANSETRON 4 MG/1
4 TABLET, ORALLY DISINTEGRATING ORAL EVERY 6 HOURS PRN
Status: DISCONTINUED | OUTPATIENT
Start: 2018-07-14 | End: 2018-07-25 | Stop reason: HOSPADM

## 2018-07-14 RX ORDER — SPIRONOLACTONE 25 MG/1
25 TABLET ORAL DAILY
Status: DISCONTINUED | OUTPATIENT
Start: 2018-07-14 | End: 2018-07-25 | Stop reason: HOSPADM

## 2018-07-14 RX ORDER — OXYCODONE HYDROCHLORIDE 5 MG/1
10 TABLET ORAL EVERY 4 HOURS PRN
Status: DISCONTINUED | OUTPATIENT
Start: 2018-07-14 | End: 2018-07-25 | Stop reason: HOSPADM

## 2018-07-14 RX ORDER — MAGNESIUM HYDROXIDE/ALUMINUM HYDROXICE/SIMETHICONE 120; 1200; 1200 MG/30ML; MG/30ML; MG/30ML
30 SUSPENSION ORAL EVERY 4 HOURS PRN
Status: DISCONTINUED | OUTPATIENT
Start: 2018-07-14 | End: 2018-07-25 | Stop reason: HOSPADM

## 2018-07-14 RX ORDER — BISACODYL 10 MG
10 SUPPOSITORY, RECTAL RECTAL DAILY PRN
Status: DISCONTINUED | OUTPATIENT
Start: 2018-07-14 | End: 2018-07-25 | Stop reason: HOSPADM

## 2018-07-14 RX ORDER — HYDROMORPHONE HYDROCHLORIDE 2 MG/1
2 TABLET ORAL EVERY 4 HOURS PRN
Status: DISCONTINUED | OUTPATIENT
Start: 2018-07-14 | End: 2018-07-17

## 2018-07-14 RX ORDER — CELECOXIB 200 MG/1
200 CAPSULE ORAL 2 TIMES DAILY
Status: DISCONTINUED | OUTPATIENT
Start: 2018-07-14 | End: 2018-07-25 | Stop reason: HOSPADM

## 2018-07-14 RX ORDER — PREGABALIN 100 MG/1
100 CAPSULE ORAL 2 TIMES DAILY
Status: DISCONTINUED | OUTPATIENT
Start: 2018-07-14 | End: 2018-07-25 | Stop reason: HOSPADM

## 2018-07-14 RX ORDER — SENNOSIDES 8.6 MG
2 TABLET ORAL
Status: DISCONTINUED | OUTPATIENT
Start: 2018-07-14 | End: 2018-07-25 | Stop reason: HOSPADM

## 2018-07-14 RX ADMIN — HYDROMORPHONE HYDROCHLORIDE 1 MG: 1 INJECTION, SOLUTION INTRAMUSCULAR; INTRAVENOUS; SUBCUTANEOUS at 00:21

## 2018-07-14 RX ADMIN — ENOXAPARIN SODIUM 40 MG: 40 INJECTION, SOLUTION INTRAVENOUS; SUBCUTANEOUS at 15:21

## 2018-07-14 RX ADMIN — CELECOXIB 200 MG: 200 CAPSULE ORAL at 08:50

## 2018-07-14 RX ADMIN — PREGABALIN 100 MG: 50 CAPSULE ORAL at 08:50

## 2018-07-14 RX ADMIN — OXYCODONE HYDROCHLORIDE 10 MG: 5 TABLET ORAL at 18:53

## 2018-07-14 RX ADMIN — ASPIRIN 81 MG 81 MG: 81 TABLET ORAL at 08:50

## 2018-07-14 RX ADMIN — FAMOTIDINE 20 MG: 20 TABLET ORAL at 15:21

## 2018-07-14 RX ADMIN — DOCUSATE SODIUM 100 MG: 100 CAPSULE, LIQUID FILLED ORAL at 18:50

## 2018-07-14 RX ADMIN — VALSARTAN 160 MG: 80 TABLET, FILM COATED ORAL at 08:50

## 2018-07-14 RX ADMIN — HYDROMORPHONE HYDROCHLORIDE 2 MG: 2 TABLET ORAL at 22:26

## 2018-07-14 RX ADMIN — MULTIPLE VITAMINS W/ MINERALS TAB 1 TABLET: TAB at 08:50

## 2018-07-14 RX ADMIN — HYDROMORPHONE HYDROCHLORIDE 2 MG: 2 TABLET ORAL at 16:23

## 2018-07-14 RX ADMIN — OXYCODONE HYDROCHLORIDE 10 MG: 10 TABLET ORAL at 08:51

## 2018-07-14 RX ADMIN — ASPIRIN 81 MG 81 MG: 81 TABLET ORAL at 16:23

## 2018-07-14 RX ADMIN — HYDROMORPHONE HYDROCHLORIDE 1 MG: 1 INJECTION, SOLUTION INTRAMUSCULAR; INTRAVENOUS; SUBCUTANEOUS at 05:14

## 2018-07-14 RX ADMIN — SPIRONOLACTONE 25 MG: 25 TABLET ORAL at 15:21

## 2018-07-14 RX ADMIN — CELECOXIB 200 MG: 200 CAPSULE ORAL at 18:51

## 2018-07-14 RX ADMIN — OXYCODONE HYDROCHLORIDE 10 MG: 5 TABLET ORAL at 15:20

## 2018-07-14 RX ADMIN — PREGABALIN 100 MG: 100 CAPSULE ORAL at 18:50

## 2018-07-14 NOTE — CONSULTS
Consult- Niakiley Sorianoell 1965, 52 y o  female MRN: [id number]    Unit/Bed#: -02 Encounter: [id number]    Primary Care Provider: Little Ugarte DO   Date and time admitted to hospital: 7/14/2018  1:04 PM      Inpatient consult to Internal Medicine  Consult performed by: Melodie Ren  Consult ordered by: Katherine Harden          Leukocytosis   Assessment & Plan    · White count is down to 11 22  · This is the best leukocyte count that we have seen since her arrival to [address] [place] [address]  · Most likely reactive in the setting of recent surgery  · Patient denies any signs or symptoms suggestive of infection which would include fever, cough, and or any type of urinary complaints  · Would monitor CBC on a p r n  basis        Essential hypertension   Assessment & Plan    · Blood pressures are relatively stable  · Would recommend continuing the valsartan-hydrochlorothiazide combination pill  · I will add hydralazine 20 mg IV q 6 hours p r n  for systolic blood pressure of greater than 160        * Primary osteoarthritis of left knee   Assessment & Plan    · Patient is status post left total knee arthroplasty a few days ago at the outside hospital  · She has been currently admitted to the acute rehab unit  · All physical therapy and rehabilitation therapy as per the primary team  · I will make some adjustments to her pain medications at this time  · I will change her OxyCodone dosing from every 6 hr to every 4 hr  · I will also add oral Dilaudid at the patient's request to be available on a p r n  basis  · She states that these were the medications that she was taking at the other facility            VTE Prophylaxis: Enoxaparin (Lovenox)  / sequential compression device     Recommendations for Discharge:  · At time of discharge patient should go home on all of her pre-admission medications at the pre-admission dosages    She should ideally follow-up with her primary care physician as well as her orthopedic surgeon for continued outpatient evaluation    Counseling / Coordination of Care Time: 20 minutes  Greater than 50% of total time spent on patient counseling and coordination of care  Collaboration of Care: Were Recommendations Directly Discussed with Primary Treatment Team? - Yes     History of Present Illness: Mert Gil is a 52 y o  female who is originally admitted to the rehabilitation service due to rehabilitation needs since she just had a left total knee replacement  We are consulted for medical management  Currently She is lying in bed and is crying because of severe pain in the left knee  She does not like the fact that her pain medication regiment was changed  She denies any calf tenderness, chest pain, shortness of breath, fevers or chills, palpitations, and or any numbness or tingling  She has had no issues with passing stool or flatus  Review of Systems:    Review of Systems   Musculoskeletal: Positive for arthralgias and joint swelling  All other systems reviewed and are negative        Past Medical and Surgical History:     Past Medical History:   Diagnosis Date    Arthritis     Hypertension        Past Surgical History:   Procedure Laterality Date    ABDOMINAL SURGERY       SECTION      FOOT SURGERY Right     bunionectomy    [place] TOTAL KNEE ARTHROPLASTY Left 2018    Procedure: ARTHROPLASTY KNEE TOTAL;  Surgeon: Jase Shepherd MD;  Location: [facility];  Service: Orthopedics    UTERINE SUSPENSION         Meds/Allergies:    all medications and allergies reviewed    Allergies: No Known Allergies    Social History:     Marital Status: Unknown    Substance Use History:   History   Alcohol Use No     History   Smoking Status    Never Smoker   Smokeless Tobacco    Never Used     Comment: Pt is a nonsmoker     History   Drug Use No       Family History:    Family History   Problem Relation Age of Onset    Stroke Mother     Arthritis Mother     Hypertension Father     Diabetes Father     Arthritis Father        Physical Exam:     Vitals:   Blood Pressure: 142/90 (07/14/18 1315)  Pulse: (!) 113 (07/14/18 1315)  Temperature: 99 3 °F (37 4 °C) (07/14/18 1315)  Temp Source: Tympanic (07/14/18 1315)  Respirations: 20 (07/14/18 1315)  Height: 5' 2" (157 5 cm) (07/14/18 1315)  Weight - Scale: 104 kg (230 lb) (07/14/18 1315)  SpO2: 95 % (07/14/18 1315)    Physical Exam   Constitutional: She is oriented to person, place, and time  She appears well-developed and well-nourished  HENT:   Head: Normocephalic and atraumatic  Nose: Nose normal    Mouth/Throat: Oropharynx is clear and moist    Eyes: Conjunctivae and EOM are normal  Pupils are equal, round, and reactive to light  Neck: Normal range of motion  Neck supple  No JVD present  No thyromegaly present  Cardiovascular: Normal rate, regular rhythm and intact distal pulses  Exam reveals no gallop and no friction rub  No murmur heard  Pulmonary/Chest: Effort normal and breath sounds normal  No respiratory distress  Abdominal: Soft  Bowel sounds are normal  She exhibits no distension and no mass  There is no tenderness  There is no guarding  Musculoskeletal: Normal range of motion  She exhibits no edema  Left knee dressing is in place  No surrounding erythema cellulitis  Stitches are in place   Lymphadenopathy:     She has no cervical adenopathy  Neurological: She is alert and oriented to person, place, and time  No cranial nerve deficit  Skin: Skin is warm  No rash noted  No erythema  Psychiatric: She has a normal mood and affect  Her behavior is normal    Vitals reviewed  Additional Data:     Lab Results: I have personally reviewed pertinent reports          Results from last 7 days  Lab Units 07/14/18  0511   WBC Thousand/uL 11 22*   HEMOGLOBIN g/dL 12 3   HEMATOCRIT % 37 3   PLATELETS Thousands/uL 209   LYMPHO PCT % 23   MONO PCT MAN % 8   EOSINO PCT MANUAL % 2       Results from last 7 days  Lab Units 07/14/18  0511   SODIUM mmol/L 137   POTASSIUM mmol/L 4 0   CHLORIDE mmol/L 102   CO2 mmol/L 27   BUN mg/dL 18   CREATININE mg/dL 0 80   CALCIUM mg/dL 8 5   TOTAL PROTEIN g/dL 6 3*   BILIRUBIN TOTAL mg/dL 0 70   ALK PHOS U/L 76   ALT U/L 81*   AST U/L 43   GLUCOSE RANDOM mg/dL 126             No results found for: HGBA1C        Imaging: I have personally reviewed pertinent reports  No orders to display       EKG, Pathology, and Other Studies Reviewed on Admission:   · EKG:  None    ** Please Note: This note has been constructed using a voice recognition system   **

## 2018-07-14 NOTE — ASSESSMENT & PLAN NOTE
· Blood pressures are relatively stable  · Would recommend continuing the valsartan-hydrochlorothiazide combination pill  · I will add hydralazine 20 mg IV q 6 hours p r n  for systolic blood pressure of greater than 160

## 2018-07-14 NOTE — PLAN OF CARE
DISCHARGE PLANNING     Discharge to home or other facility with appropriate resources Progressing        HEMATOLOGIC - ADULT     Maintains hematologic stability Progressing        INFECTION - ADULT     Absence or prevention of progression during hospitalization Progressing     Absence of fever/infection during neutropenic period Progressing        MUSCULOSKELETAL - ADULT     Maintain or return mobility to safest level of function Progressing     Maintain proper alignment of affected body part Progressing        PAIN - ADULT     Verbalizes/displays adequate comfort level or baseline comfort level Progressing        SAFETY ADULT     Patient will remain free of falls Progressing     Maintain or return to baseline ADL function Progressing     Maintain or return mobility status to optimal level Progressing        SKIN/TISSUE INTEGRITY - ADULT     Skin integrity remains intact Progressing     Incision(s), wounds(s) or drain site(s) healing without S/S of infection Progressing     Oral mucous membranes remain intact Progressing

## 2018-07-14 NOTE — SOCIAL WORK
The [facility] is [facility] and she is agreeable to the transport she was  picked up by [facility] and she is going via w/c [facility] and she is agreeable to this  I [facility] and they are agreeable to her coming to them and they are  aware of the  time

## 2018-07-14 NOTE — H&P
H&P- Paolo Langston 1965, 52 y o  female MRN: [id number]    Unit/Bed#: -02 Encounter: [id number]    Primary Care Provider: Marija Negrete DO   Date and time admitted to hospital: 7/14/2018  1:04 PM        * Primary osteoarthritis of left knee   Assessment & Plan    OA L knee s/p L TKA    PT/OT  Celebrex, Tramadol, OxyIR  Lovenox DVT prophy        Essential hypertension   Assessment & Plan    HTN    Antihypertensives            PRIMARY REHAB IMPAIRMENT CATEGORY: Orthopedic single LE joint replacement    ADMITTING REHAB DIAGNOSIS:  L TKA          History of Present Illness: Paolo Langston is a 52 y o  female who presented to the [address] with a h/o chronic b/l knee pain, L worse than R, unresponsive to conservative measures, worsening and limiting ambulation  On 7/11 /18 she underwent an elective L TKA  She had some post op leucocytosis, likely reactive and effusion  She is tolerating PT and is now admitted for inpatient rehab  She is POD #3  WBAT  Subjective: Patient is alert,  appears well, in NAD  Review of Systems: A 14-point review of systems was performed  Negative except as listed above  Plan:   - Acute comprehensive interdisciplinary inpatient rehabilitation including PT, OT, SLP, RN, CM, SW, dietary, psychology, etc     CODE: full    Restrictions include:  WBAT  DVT  Anticoagulated  Fall precautions      Functional History/ Status: Prior to surgery pt was Independent in ADLs and mobility  On admission to rehab she is supervision for grooming, toileting, bed/chair transfers, Min assist for bathing,LE dressing, toilet transfers  She ambulated 140ft min Assist with a RW             Social History: Lives with her  and son in a 2 level home, 2 step entry, 13 steps to bathroom  Works as an RN, drives, PTA I adls & mobility  Denies tobacco /ETOH use    Will be discharged home with family      Physical Exam:    Temp:  [98 6 °F (37 °C)-99 7 °F (37 6 °C)] 99 3 °F (37 4 °C)  HR:  [] 113  Resp:  [16-20] 20  BP: (112-142)/(62-90) 142/90    General: alert, no apparent distress, cooperative and comfortable  Head: Normal, normocephalic, atraumatic  Eye: Normal external eye, conjunctiva, lids   Ears: Normal external ears  Nose: Normal external nose, mucus membranes  Pharynx: Dental Hygiene adequate  Normal buccal mucosa  Normal pharynx  Pulmonary: no retractions, no abnormal respiratory pattern, chest expansion normal  Cardiovascular: normal rate, regular rhythm, normal S1, S2, no murmurs, rubs, clicks or gallops  Abdomen: soft, nontender, nondistended, no masses or organomegaly  Skin/Extremity:L knee incisional dressing intact, Mod LLE edema, calves soft, NT b/l  Neurologic: normal without focal findings, mental status, speech normal, alert and oriented x3, NIMISHA and reflexes normal and symmetric  MUSCULOSKELETAL:  Patient is progressing with ADLs and functional mobility, cognition & speech  Laboratory:      Results from last 7 days  Lab Units 07/14/18  0511 07/13/18  0555 07/12/18  0416   HEMOGLOBIN g/dL 12 3 12 6 12 2   HEMATOCRIT % 37 3 37 8 36 5   WBC Thousand/uL 11 22* 12 66* 13 90*       Results from last 7 days  Lab Units 07/14/18  0511 07/13/18  0555 07/12/18  0416   BUN mg/dL 18 22 23   SODIUM mmol/L 137 138 138   POTASSIUM mmol/L 4 0 4 2 3 9   CHLORIDE mmol/L 102 103 103   GLUCOSE RANDOM mg/dL 126 113 135   CREATININE mg/dL 0 80 0 91 0 87   AST U/L 43  --  20   ALT U/L 81*  --  52            Wt Readings from Last 1 Encounters:   07/14/18 104 kg (230 lb)     Estimated body mass index is 42 07 kg/m² as calculated from the following:    Height as of this encounter: 5' 2" (1 575 m)  Weight as of this encounter: 104 kg (230 lb)     Rehabilitation Prognosis: good     Tolerance for three hours of therapy a day: good     Family/Patient Goals:  Patient/family's goals: return home          Patient will receive PT and  OT 90 minutes each per day, five days per week to achieve rehab goals  Discharge Planning:  Rehabilitation and discharge goals discussed with the patient and/or family  Case Managment and Social Work to review patient/family resources and to coordinate Discharge Planning  Estimated length of stay: 10-14d    Patient and Family Education and Training:  Rehabilitation and discharge goals discussed with the patient and/or family  Patient/family education/training needs to be discussed in weekly team meeting          Past Medical History:   Past Surgical History:   Family History:   Social history:   Past Medical History:   Diagnosis Date    Arthritis     Hypertension     Past Surgical History:   Procedure Laterality Date    ABDOMINAL SURGERY       SECTION      FOOT SURGERY Right     bunionectomy    CA TOTAL KNEE ARTHROPLASTY Left 2018    Procedure: ARTHROPLASTY KNEE TOTAL;  Surgeon: Jase Shepherd MD;  Location: [facility];  Service: Orthopedics    UTERINE SUSPENSION       Family History   Problem Relation Age of Onset    Stroke Mother     Arthritis Mother     Hypertension Father     Diabetes Father     Arthritis Father       Social History     Social History    Marital status: Unknown     Spouse name: N/A    Number of children: N/A    Years of education: N/A     Social History Main Topics    Smoking status: Never Smoker    Smokeless tobacco: Never Used      Comment: Pt is a nonsmoker    Alcohol use No    Drug use: No    Sexual activity: No     Other Topics Concern    Not on file     Social History Narrative    No narrative on file          Current Medical Diagnosis Medications Allergies   Patient Active Problem List   Diagnosis    Primary osteoarthritis of left knee    Essential hypertension    Leukocytosis      Current Facility-Administered Medications:     aluminum-magnesium hydroxide-simethicone (MYLANTA) 200-200-20 mg/5 mL oral suspension 30 mL, 30 mL, Oral, Q4H PRN, Ashish Kohli MD  [facility] aspirin chewable tablet 81 mg, 81 mg, Oral, BID With Meals, Davidson Cabrales MD    bisacodyl (DULCOLAX) rectal suppository 10 mg, 10 mg, Rectal, Daily PRN, Davidson Cabrales MD    celecoxib (CeleBREX) capsule 200 mg, 200 mg, Oral, BID, Davidson Cabrales MD    docusate sodium (COLACE) capsule 100 mg, 100 mg, Oral, BID, Davidson Cabrales MD    enoxaparin (LOVENOX) subcutaneous injection 40 mg, 40 mg, Subcutaneous, Q24H [address], Davidson Cabrales MD    famotidine (PEPCID) tablet 20 mg, 20 mg, Oral, Daily, Davidson Cabrales MD    multivitamin (THERAGRAN) per tablet 1 tablet, 1 tablet, Oral, Daily, Davidson Cabrales MD    ondansetron (ZOFRAN-ODT) dispersible tablet 4 mg, 4 mg, Oral, Q6H PRN, Davidson Cabrales MD    oxyCODONE (ROXICODONE) IR tablet 10 mg, 10 mg, Oral, Q6H PRN, Davidson Cabrales MD    pregabalin (LYRICA) capsule 100 mg, 100 mg, Oral, BID, Davidson Cabrales MD    spironolactone (ALDACTONE) tablet 25 mg, 25 mg, Oral, Daily, Davidson Cabrales MD    traMADol (ULTRAM) tablet 50 mg, 50 mg, Oral, Q6H PRN, Davidson Cabrales MD    [START ON 7/15/2018] valsartan-hydrochlorothiazide (DIOVAN /25) combo dose, , Oral, Daily, Davidson Cabrales MD No Known Allergies        Medical Necessity Criteria for [facility] Admission: No complications    In addition, the preadmission screen, post-admission physical evaluation, overall plan of care and admissions order demonstrate a reasonable expectation that the following criteria were met at the time of admission to the [place] [address]  The patient requires active and ongoing therapeutic intervention of multiple therapy disciplines (physical therapy, occupational therapy, speech-language pathology, or prosthetics/orthotics), one of which is physical or occupational therapy      2  Patient requires an intensive rehabilitation therapy program, as defined in Chapter 1, section 110 2 2 of the CMS Medicare Policy Manual  This intensive rehabilitation therapy program will consist of at least 3 hours of therapy per day at least 5 days per week or at least 15 hours of intensive rehabilitation therapy within a 7 consecutive day period, beginning with the date of admission to the [facility]  3  The patient is reasonably expected to actively participate in, and benefit significantly from, the intensive rehabilitation therapy program as defined in Chapter 1, section 110 2 2 of the CMS Medicare Policy Manual at this time of admission to the [facility]  She can reasonably be expected to make measurable improvement (that will be of practical value to improve the patients functional capacity or adaptation to impairments) as a result of the rehabilitation treatment, as defined in section 110 3, and such improvement can be expected to be made within the prescribed period of time  As noted in the CMS Medicare Policy Manual, the patient need not be expected to achieve complete independence in the domain of self-care nor be expected to return to his or her prior level of functioning in order to meet this standard  4  The patient must require physician supervision by a rehabilitation physician  As such, a rehabilitation physician will conduct face-to-face visits with the patient at least 3 days per week throughout the patients stay in the [facility] to assess the patient both medically and functionally, as well as to modify the course of treatment as needed to maximize the patients capacity to benefit from the rehabilitation process    5  The patient requires an intensive and coordinated interdisciplinary approach to providing rehabilitation, as defined in Chapter 1, section 110 2 5 of the CMS Medicare Policy Manual  This will be achieved through periodic team conferences, conducted at least once in a 7-day period, and comprising of an interdisciplinary team of medical professionals consisting of: a rehabilitation physician, registered nurse,  and/or , and a licensed/certified therapist from each therapy discipline involved in treating the patient  Changes Since Pre-admission Assessment: None -This patient's participation in rehab continues to be reasonable, necessary and appropriate  CMS Required Post-Admission Physician Evaluation Elements  History and Physical, including medical history, functional history and active comorbidities as in above text      PostAdmission Physician Evaluation:  The patient has the potential to make improvement and is in need of physical, occupational, and/or therapy services  The patient may also need nutritional services  Given the patient's complex medical condition and risk of further medical complications, rehabilitative services cannot be safely provided at a lower level of care, such as a skilled nursing facility  I have reviewed the patient's functional and medical status at the time of the preadmission screening and they are the same as on the day of this admission  I acknowledge that I have personally performed a full physical examination on this patient within 24 hours of admission  The patient demonstrated understanding the rehabilitation program and the discharge process after we discussed them      Agree in entirety: yes  Minor adaptions: none    Major changes: none     Nubia Tang MD  Physical Medicine and Rehabilitation    ** Please Note: Fluency Direct voice to text software may have been used in the creation of this document   **

## 2018-07-14 NOTE — ASSESSMENT & PLAN NOTE
· Patient is status post left total knee arthroplasty a few days ago at the outside hospital  · She has been currently admitted to the acute rehab unit  · All physical therapy and rehabilitation therapy as per the primary team  · I will make some adjustments to her pain medications at this time  · I will change her OxyCodone dosing from every 6 hr to every 4 hr  · I will also add oral Dilaudid at the patient's request to be available on a p r n  basis  · She states that these were the medications that she was taking at the other facility

## 2018-07-14 NOTE — ASSESSMENT & PLAN NOTE
· White count is down to 11 22  · This is the best leukocyte count that we have seen since her arrival to [address]  · Most likely reactive in the setting of recent surgery  · Patient denies any signs or symptoms suggestive of infection which would include fever, cough, and or any type of urinary complaints  · Would monitor CBC on a p r n  basis

## 2018-07-15 LAB
ANION GAP SERPL CALCULATED.3IONS-SCNC: 7 MMOL/L (ref 4–13)
BILIRUB UR QL STRIP: NEGATIVE
BUN SERPL-MCNC: 19 MG/DL (ref 7–25)
CALCIUM SERPL-MCNC: 9.1 MG/DL (ref 8.6–10.5)
CHLORIDE SERPL-SCNC: 101 MMOL/L (ref 98–107)
CLARITY UR: CLEAR
CO2 SERPL-SCNC: 29 MMOL/L (ref 21–31)
COLOR UR: YELLOW
CREAT SERPL-MCNC: 0.88 MG/DL (ref 0.6–1.2)
EOSINOPHIL # BLD AUTO: 0.31 THOUSAND/UL (ref 0–0.61)
EOSINOPHIL NFR BLD MANUAL: 4 % (ref 0–6)
ERYTHROCYTE [DISTWIDTH] IN BLOOD BY AUTOMATED COUNT: 14.2 % (ref 11.5–14.5)
GFR SERPL CREATININE-BSD FRML MDRD: 74 ML/MIN/1.73SQ M
GLUCOSE SERPL-MCNC: 123 MG/DL (ref 65–99)
GLUCOSE UR STRIP-MCNC: ABNORMAL MG/DL
HCT VFR BLD AUTO: 34.4 % (ref 34.8–46.1)
HGB BLD-MCNC: 11.6 G/DL (ref 12–16)
HGB UR QL STRIP.AUTO: NEGATIVE
KETONES UR STRIP-MCNC: NEGATIVE MG/DL
LEUKOCYTE ESTERASE UR QL STRIP: NEGATIVE
LYMPHOCYTES # BLD AUTO: 1.56 THOUSAND/UL (ref 0.6–4.47)
LYMPHOCYTES # BLD AUTO: 20 % (ref 20–51)
MCH RBC QN AUTO: 27.5 PG (ref 26–34)
MCHC RBC AUTO-ENTMCNC: 33.6 G/DL (ref 31–37)
MCV RBC AUTO: 82 FL (ref 81–99)
MONOCYTES # BLD AUTO: 0.62 THOUSAND/UL (ref 0–1.22)
MONOCYTES NFR BLD AUTO: 8 % (ref 4–12)
NEUTS BAND NFR BLD MANUAL: 4 % (ref 0–8)
NEUTS SEG # BLD: 5.3 THOUSAND/UL (ref 1.81–6.82)
NEUTS SEG NFR BLD AUTO: 64 % (ref 42–75)
NITRITE UR QL STRIP: NEGATIVE
NRBC BLD AUTO-RTO: 0 /100 WBCS
PH UR STRIP.AUTO: 6 [PH] (ref 5–8)
PLATELET # BLD AUTO: 229 THOUSANDS/UL (ref 149–390)
PMV BLD AUTO: 8.5 FL (ref 8.6–11.7)
POTASSIUM SERPL-SCNC: 3.9 MMOL/L (ref 3.5–5.5)
PROT UR STRIP-MCNC: NEGATIVE MG/DL
RBC # BLD AUTO: 4.21 MILLION/UL (ref 3.9–5.2)
SODIUM SERPL-SCNC: 137 MMOL/L (ref 134–143)
SP GR UR STRIP.AUTO: 1.02 (ref 1–1.03)
TOTAL CELLS COUNTED SPEC: 100
UROBILINOGEN UR QL STRIP.AUTO: 0.2 E.U./DL
WBC # BLD AUTO: 7.8 THOUSAND/UL (ref 4.8–10.8)

## 2018-07-15 PROCEDURE — 81003 URINALYSIS AUTO W/O SCOPE: CPT | Performed by: NURSE PRACTITIONER

## 2018-07-15 PROCEDURE — 85007 BL SMEAR W/DIFF WBC COUNT: CPT | Performed by: PHYSICAL MEDICINE & REHABILITATION

## 2018-07-15 PROCEDURE — 80048 BASIC METABOLIC PNL TOTAL CA: CPT | Performed by: PHYSICAL MEDICINE & REHABILITATION

## 2018-07-15 PROCEDURE — 85027 COMPLETE CBC AUTOMATED: CPT | Performed by: PHYSICAL MEDICINE & REHABILITATION

## 2018-07-15 RX ORDER — POLYETHYLENE GLYCOL 3350 17 G/17G
17 POWDER, FOR SOLUTION ORAL DAILY
Status: DISCONTINUED | OUTPATIENT
Start: 2018-07-15 | End: 2018-07-25 | Stop reason: HOSPADM

## 2018-07-15 RX ORDER — VALSARTAN 160 MG/1
160 TABLET ORAL DAILY
Status: DISCONTINUED | OUTPATIENT
Start: 2018-07-15 | End: 2018-07-25 | Stop reason: HOSPADM

## 2018-07-15 RX ORDER — HYDROCHLOROTHIAZIDE 25 MG/1
25 TABLET ORAL DAILY
Status: DISCONTINUED | OUTPATIENT
Start: 2018-07-15 | End: 2018-07-25 | Stop reason: HOSPADM

## 2018-07-15 RX ADMIN — POLYETHYLENE GLYCOL 3350 17 G: 17 POWDER, FOR SOLUTION ORAL at 11:46

## 2018-07-15 RX ADMIN — PREGABALIN 100 MG: 100 CAPSULE ORAL at 17:02

## 2018-07-15 RX ADMIN — OXYCODONE HYDROCHLORIDE 10 MG: 5 TABLET ORAL at 23:06

## 2018-07-15 RX ADMIN — ENOXAPARIN SODIUM 40 MG: 40 INJECTION, SOLUTION INTRAVENOUS; SUBCUTANEOUS at 08:59

## 2018-07-15 RX ADMIN — MULTIPLE VITAMINS W/ MINERALS TAB 1 TABLET: TAB at 09:01

## 2018-07-15 RX ADMIN — HYDROCHLOROTHIAZIDE 25 MG: 25 TABLET ORAL at 10:06

## 2018-07-15 RX ADMIN — OXYCODONE HYDROCHLORIDE 10 MG: 5 TABLET ORAL at 02:04

## 2018-07-15 RX ADMIN — ASPIRIN 81 MG 81 MG: 81 TABLET ORAL at 17:03

## 2018-07-15 RX ADMIN — DOCUSATE SODIUM 100 MG: 100 CAPSULE, LIQUID FILLED ORAL at 09:00

## 2018-07-15 RX ADMIN — ASPIRIN 81 MG 81 MG: 81 TABLET ORAL at 07:29

## 2018-07-15 RX ADMIN — SPIRONOLACTONE 25 MG: 25 TABLET ORAL at 09:00

## 2018-07-15 RX ADMIN — FAMOTIDINE 20 MG: 20 TABLET ORAL at 09:01

## 2018-07-15 RX ADMIN — PREGABALIN 100 MG: 100 CAPSULE ORAL at 09:01

## 2018-07-15 RX ADMIN — OXYCODONE HYDROCHLORIDE 10 MG: 5 TABLET ORAL at 07:29

## 2018-07-15 RX ADMIN — VALSARTAN 160 MG: 160 TABLET, FILM COATED ORAL at 10:07

## 2018-07-15 RX ADMIN — HYDROMORPHONE HYDROCHLORIDE 2 MG: 2 TABLET ORAL at 14:15

## 2018-07-15 RX ADMIN — OXYCODONE HYDROCHLORIDE 10 MG: 5 TABLET ORAL at 17:03

## 2018-07-15 RX ADMIN — CELECOXIB 200 MG: 200 CAPSULE ORAL at 09:04

## 2018-07-15 RX ADMIN — HYDROMORPHONE HYDROCHLORIDE 2 MG: 2 TABLET ORAL at 04:53

## 2018-07-15 RX ADMIN — DOCUSATE SODIUM 100 MG: 100 CAPSULE, LIQUID FILLED ORAL at 17:06

## 2018-07-15 RX ADMIN — CELECOXIB 200 MG: 200 CAPSULE ORAL at 17:02

## 2018-07-15 NOTE — PROGRESS NOTES
Progress Note - Danay Maria R 1965, 52 y o  female MRN: [id number]    Unit/Bed#: -02 Encounter: [id number]    Primary Care Provider: Edilson Ballard DO   Date and time admitted to hospital: 7/14/2018  1:04 PM        * Primary osteoarthritis of left knee   Assessment & Plan    OA L knee s/p L TKA    PT/OT  Celebrex, Tramadol, OxyIR  Lovenox DVT prophy        Essential hypertension   Assessment & Plan    HTN    Antihypertensives            Vitals:  Temp:  [98 9 °F (37 2 °C)-99 9 °F (37 7 °C)] 98 9 °F (37 2 °C)  HR:  [] 95  Resp:  [16-20] 16  BP: (142-162)/(88-90) 162/90    Physical Exam:  General: alert, no apparent distress, cooperative   C/o L knee pain but improving  No BM since prior to surgery  HENMT: Head: Normal, normocephalic, atraumatic  Eye: Normal external eye, conjunctiva, lids   Ears: Normal external ears  Nose: Normal external nose, mucus membranes  COR: [id number]  Pulmonary: chest expansion normal, no retractions, no accessory muscle usage, no wheezes, rales, or rhonchi   Abdomen: soft, nontender, nondistended, no masses or organomegaly  Skin/Extremity: L knee incisional dressing intact, Mod edema  Calves soft, NT b/l  Neurologic: Awake alert orientedx3  Non focal  Psych: normal mood, behavior, speech, dress, and thought processes  Musculoskeletal: AROM wfl all extremities except limited rom L knee  Patient is progressing with ADLs and functional mobility, cognition & speech        Current Facility-Administered Medications   Medication Dose Route Frequency Provider Last Rate Last Dose    aluminum-magnesium hydroxide-simethicone (MYLANTA) 200-200-20 mg/5 mL oral suspension 30 mL  30 mL Oral Q4H PRN Marvin Parisi MD        aspirin chewable tablet 81 mg  81 mg Oral BID With Meals Marvin Parisi MD   81 mg at 07/15/18 0729    bisacodyl (DULCOLAX) rectal suppository 10 mg  10 mg Rectal Daily PRN Marvin Parisi MD        celecoxib (CeleBREX) capsule 200 mg  200 mg Oral BID Ovidio Dorado MD   200 mg at 07/15/18 0904    docusate sodium (COLACE) capsule 100 mg  100 mg Oral BID Ovidio Dorado MD   100 mg at 07/15/18 0900    docusate sodium (COLACE) capsule 100 mg  100 mg Oral BID PRN ANGELIKA Dickey        enoxaparin (LOVENOX) subcutaneous injection 40 mg  40 mg Subcutaneous Q24H [address] Ovidio Dorado MD   40 mg at 07/15/18 0859    famotidine (PEPCID) tablet 20 mg  20 mg Oral Daily Ovidio Dorado MD   20 mg at 07/15/18 0901    valsartan (DIOVAN) tablet 160 mg  160 mg Oral Daily Ovidio Dorado MD   160 mg at 07/15/18 1007    And    hydrochlorothiazide (HYDRODIURIL) tablet 25 mg  25 mg Oral Daily Ovidio Dorado MD   25 mg at 07/15/18 1006    HYDROmorphone (DILAUDID) tablet 2 mg  2 mg Oral Q4H PRN Lynn Durán MD   2 mg at 07/15/18 0453    magnesium hydroxide (MILK OF MAGNESIA) 400 mg/5 mL oral suspension 30 mL  30 mL Oral Daily PRN ANGELIKA Dickey        multivitamin-minerals (CENTRUM) tablet 1 tablet  1 tablet Oral Daily Ovidio Dorado MD   1 tablet at 07/15/18 0901    ondansetron (ZOFRAN-ODT) dispersible tablet 4 mg  4 mg Oral Q6H PRN Ovidio Dorado MD        oxyCODONE (ROXICODONE) IR tablet 10 mg  10 mg Oral Q4H PRN Lynn Durán MD   10 mg at 07/15/18 0729    pregabalin (LYRICA) capsule 100 mg  100 mg Oral BID Ovidio Dorado MD   100 mg at 07/15/18 0901    senna (SENOKOT) tablet 17 2 mg  2 tablet Oral HS PRN ANGELIKA Dickey        spironolactone (ALDACTONE) tablet 25 mg  25 mg Oral Daily Ovidio Dorado MD   25 mg at 07/15/18 0900    traMADol (ULTRAM) tablet 50 mg  50 mg Oral Q6H PRN Ovidio Dorado, MD            Laboratory:    Lab Results   Component Value Date    HGB 11 6 (L) 07/15/2018    HCT 34 4 (L) 07/15/2018    WBC 7 80 07/15/2018     Lab Results   Component Value Date    BUN 19 07/15/2018     07/15/2018    K 3 9 07/15/2018     07/15/2018    GLUCOSE 123 (H) 07/15/2018    CREATININE 0 88 07/15/2018     Lab Results   Component Value Date    PROTIME 12 0 06/28/2018    INR 0 93 06/28/2018

## 2018-07-15 NOTE — NURSING NOTE
Awake in bed  C/O pain to left knee, medicated as ordered appropriately  Max assist with transfers from bed to [facility]  Pain is a barrier  Educated about pain medication- dosage, side effects  Ice packs during night for pain relief  Pt understood  Dressing on left knee CDI, to stay in place per surgeon  Assessment otherwise unchanged  Continuing to monitor  Items within reach

## 2018-07-15 NOTE — PROGRESS NOTES
Feeling better today, not as anxious and states pain is much better today  DDI to left knee, NV check stable to left leg, non pitting edema  Pain relief with prn pain meds  Ice to left knee prn with relief  Continue same plan of care

## 2018-07-15 NOTE — NURSING NOTE
Hospitalist [facility] paged regarding elevated temp @ 99 9 and elevated , WBC count 11 22  Orders placed and will carry out  Pt resting in bed, snoring no s/s of pain distress

## 2018-07-16 LAB
ANION GAP SERPL CALCULATED.3IONS-SCNC: 8 MMOL/L (ref 4–13)
BASOPHILS # BLD AUTO: 0.1 THOUSANDS/ΜL (ref 0–0.1)
BASOPHILS NFR BLD AUTO: 1 % (ref 0–2)
BILIRUB UR QL STRIP: NEGATIVE
BUN SERPL-MCNC: 19 MG/DL (ref 7–25)
CALCIUM SERPL-MCNC: 9.7 MG/DL (ref 8.6–10.5)
CHLORIDE SERPL-SCNC: 98 MMOL/L (ref 98–107)
CLARITY UR: CLEAR
CO2 SERPL-SCNC: 29 MMOL/L (ref 21–31)
COLOR UR: ABNORMAL
CREAT SERPL-MCNC: 0.89 MG/DL (ref 0.6–1.2)
EOSINOPHIL # BLD AUTO: 0.2 THOUSAND/ΜL (ref 0–0.61)
EOSINOPHIL NFR BLD AUTO: 2 % (ref 0–5)
ERYTHROCYTE [DISTWIDTH] IN BLOOD BY AUTOMATED COUNT: 13.7 % (ref 11.5–14.5)
GFR SERPL CREATININE-BSD FRML MDRD: 73 ML/MIN/1.73SQ M
GLUCOSE SERPL-MCNC: 123 MG/DL (ref 65–99)
GLUCOSE UR STRIP-MCNC: NEGATIVE MG/DL
HCT VFR BLD AUTO: 33.6 % (ref 34.8–46.1)
HGB BLD-MCNC: 11.4 G/DL (ref 12–16)
HGB UR QL STRIP.AUTO: NEGATIVE
KETONES UR STRIP-MCNC: NEGATIVE MG/DL
LEUKOCYTE ESTERASE UR QL STRIP: NEGATIVE
LYMPHOCYTES # BLD AUTO: 2 THOUSANDS/ΜL (ref 0.6–4.47)
LYMPHOCYTES NFR BLD AUTO: 21 % (ref 21–51)
MCH RBC QN AUTO: 27.4 PG (ref 26–34)
MCHC RBC AUTO-ENTMCNC: 33.9 G/DL (ref 31–37)
MCV RBC AUTO: 81 FL (ref 81–99)
MONOCYTES # BLD AUTO: 1.1 THOUSAND/ΜL (ref 0.17–1.22)
MONOCYTES NFR BLD AUTO: 11 % (ref 2–12)
NEUTROPHILS # BLD AUTO: 6 THOUSANDS/ΜL (ref 1.4–6.5)
NEUTS SEG NFR BLD AUTO: 65 % (ref 42–75)
NITRITE UR QL STRIP: NEGATIVE
NRBC BLD AUTO-RTO: 0 /100 WBCS
PH UR STRIP.AUTO: 6.5 [PH] (ref 5–8)
PLATELET # BLD AUTO: 261 THOUSANDS/UL (ref 149–390)
PMV BLD AUTO: 7.8 FL (ref 8.6–11.7)
POTASSIUM SERPL-SCNC: 3.8 MMOL/L (ref 3.5–5.5)
PROT UR STRIP-MCNC: NEGATIVE MG/DL
RBC # BLD AUTO: 4.16 MILLION/UL (ref 3.9–5.2)
SODIUM SERPL-SCNC: 135 MMOL/L (ref 134–143)
SP GR UR STRIP.AUTO: 1.01 (ref 1–1.03)
UROBILINOGEN UR QL STRIP.AUTO: 0.2 E.U./DL
WBC # BLD AUTO: 9.3 THOUSAND/UL (ref 4.8–10.8)

## 2018-07-16 PROCEDURE — 80048 BASIC METABOLIC PNL TOTAL CA: CPT | Performed by: PHYSICAL MEDICINE & REHABILITATION

## 2018-07-16 PROCEDURE — 85025 COMPLETE CBC W/AUTO DIFF WBC: CPT | Performed by: PHYSICAL MEDICINE & REHABILITATION

## 2018-07-16 PROCEDURE — 97535 SELF CARE MNGMENT TRAINING: CPT

## 2018-07-16 PROCEDURE — 97530 THERAPEUTIC ACTIVITIES: CPT

## 2018-07-16 PROCEDURE — 97165 OT EVAL LOW COMPLEX 30 MIN: CPT

## 2018-07-16 PROCEDURE — 97116 GAIT TRAINING THERAPY: CPT

## 2018-07-16 PROCEDURE — 97161 PT EVAL LOW COMPLEX 20 MIN: CPT

## 2018-07-16 PROCEDURE — 97110 THERAPEUTIC EXERCISES: CPT

## 2018-07-16 PROCEDURE — 81003 URINALYSIS AUTO W/O SCOPE: CPT | Performed by: PHYSICAL MEDICINE & REHABILITATION

## 2018-07-16 RX ORDER — ONDANSETRON 4 MG/1
4 TABLET, ORALLY DISINTEGRATING ORAL EVERY 6 HOURS PRN
Status: DISCONTINUED | OUTPATIENT
Start: 2018-07-16 | End: 2018-07-16 | Stop reason: SDUPTHER

## 2018-07-16 RX ORDER — ACETAMINOPHEN 325 MG/1
650 TABLET ORAL EVERY 6 HOURS PRN
Status: DISCONTINUED | OUTPATIENT
Start: 2018-07-16 | End: 2018-07-25 | Stop reason: HOSPADM

## 2018-07-16 RX ORDER — LIDOCAINE 50 MG/G
1 PATCH TOPICAL DAILY
Status: DISCONTINUED | OUTPATIENT
Start: 2018-07-16 | End: 2018-07-25 | Stop reason: HOSPADM

## 2018-07-16 RX ADMIN — OXYCODONE HYDROCHLORIDE 10 MG: 5 TABLET ORAL at 17:42

## 2018-07-16 RX ADMIN — ACETAMINOPHEN 650 MG: 325 TABLET ORAL at 05:05

## 2018-07-16 RX ADMIN — DOCUSATE SODIUM 100 MG: 100 CAPSULE, LIQUID FILLED ORAL at 08:02

## 2018-07-16 RX ADMIN — HYDROCHLOROTHIAZIDE 25 MG: 25 TABLET ORAL at 08:03

## 2018-07-16 RX ADMIN — PREGABALIN 100 MG: 100 CAPSULE ORAL at 08:02

## 2018-07-16 RX ADMIN — VALSARTAN 160 MG: 160 TABLET, FILM COATED ORAL at 08:03

## 2018-07-16 RX ADMIN — ASPIRIN 81 MG 81 MG: 81 TABLET ORAL at 17:08

## 2018-07-16 RX ADMIN — CELECOXIB 200 MG: 200 CAPSULE ORAL at 17:08

## 2018-07-16 RX ADMIN — DOCUSATE SODIUM 100 MG: 100 CAPSULE, LIQUID FILLED ORAL at 17:08

## 2018-07-16 RX ADMIN — SPIRONOLACTONE 25 MG: 25 TABLET ORAL at 08:03

## 2018-07-16 RX ADMIN — CELECOXIB 200 MG: 200 CAPSULE ORAL at 08:11

## 2018-07-16 RX ADMIN — FAMOTIDINE 20 MG: 20 TABLET ORAL at 08:02

## 2018-07-16 RX ADMIN — ONDANSETRON 4 MG: 4 TABLET, ORALLY DISINTEGRATING ORAL at 04:54

## 2018-07-16 RX ADMIN — ENOXAPARIN SODIUM 40 MG: 40 INJECTION, SOLUTION INTRAVENOUS; SUBCUTANEOUS at 08:03

## 2018-07-16 RX ADMIN — PREGABALIN 100 MG: 100 CAPSULE ORAL at 17:08

## 2018-07-16 RX ADMIN — OXYCODONE HYDROCHLORIDE 10 MG: 5 TABLET ORAL at 11:30

## 2018-07-16 RX ADMIN — ONDANSETRON 4 MG: 4 TABLET, ORALLY DISINTEGRATING ORAL at 11:30

## 2018-07-16 RX ADMIN — POLYETHYLENE GLYCOL 3350 17 G: 17 POWDER, FOR SOLUTION ORAL at 08:03

## 2018-07-16 RX ADMIN — OXYCODONE HYDROCHLORIDE 10 MG: 5 TABLET ORAL at 06:46

## 2018-07-16 RX ADMIN — ASPIRIN 81 MG 81 MG: 81 TABLET ORAL at 08:03

## 2018-07-16 RX ADMIN — LIDOCAINE 1 PATCH: 50 PATCH CUTANEOUS at 08:03

## 2018-07-16 RX ADMIN — MULTIPLE VITAMINS W/ MINERALS TAB 1 TABLET: TAB at 08:02

## 2018-07-16 NOTE — PROGRESS NOTES
OT eval and ADL   07/16/18 1030   Patient Data   Rehab Impairment (L knee OA s/p TKR)   Date of Onset 07/11/18   Support System   Name (, Jn Cochran)   Home Setup   Type of Home Multi Level   Method of Entry Curb  (1 + 1)   Number of Stairs in Home (13)   Second Floor Bathroom Full;Tub; Shower   First Floor Setup Available No   Available Equipment Shower Chair;Bedside Commode;Roller Walker;Single [employer]   Prior IADL Participation   Money Management Identify Money;Estimate Costs;Estimate Change;Combine Bills;Manage Checkbook   Meal Preparation Full Participation   Laundry Full Participation   Home Cleaning Full Participation   Prior Level of Function   Self-Care 3  Independent - Patient completed the activities by him/herself, with or without an assistive device, with no assistance from a helper  Functional Cognition 3  Independent - Patient completed the activities by him/herself, with or without an assistive device, with no assistance from a helper  Restrictions/Precautions   Weight Bearing Restrictions No  (WBAT LLE)   Pain Assessment   Pain Assessment 0-10   Pain Score 8   Pain Type Surgical pain   Pain Location Knee   Pain Orientation Left   QI: Eating   Assistance Needed Independent   Eating CARE Score 6   QI: Oral Hygiene   Assistance Needed Set-up / clean-up   Oral Hygiene CARE Score 5   Grooming   Able To Initiate Tasks;Comb/Brush Hair;Wash/Dry Face;Brush/Clean Teeth;Wash/Dry Hands   Grooming (FIM) 5 - [place] sets up supplies or applies device   QI: Shower/Bathe Self   Assistance Needed Physical assistance   Assistance Provided by [place] 50%-74%   Shower/Bathe Self CARE Score 2   Bathing   Assessed Bath Style Sponge Bath   Anticipated D/C Bath Style Shower; Tub   Able to Gather/Transport No   Able to [employer] Temperature No   Able to Wash/Rinse/Dry (body part) Left Arm;Right Arm;L Upper Leg;R Upper Leg;Chest;Abdomen;Perineal Area; Buttocks   Bathing (FIM) 4 - Patient completes 8/10 or 9/10 parts QI: Upper Body Dressing   Assistance Needed Set-up / clean-up   Upper Body Dressing CARE Score 5   QI: Lower Body Dressing   Assistance Needed Physical assistance   Assistance Provided by [place] 75% or more   Lower Body Dressing CARE Score 2   QI: Putting On/Taking Off Footwear   Assistance Needed Physical assistance   Assistance Provided by [place] 75% or more   Putting On/Taking Off Footwear CARE Score 2   Dressing/Undressing Clothing   Remove UB Clothes Pullover Shirt   Remove LB Clothes Undergarment;Socks   [address] LB Clothes Undergarment;Socks   UB Dressing (FIM) 5 - [place] sets up supplies or applies device   LB Dressing (FIM) 2 - Patient completes 25-49% of all tasks   QI: [place] Score 4   Toileting   Able to [address] down yes, up yes  Manage Hygiene Bladder; Bowel   Limitations Noted In Balance;ROM;Safety;LE Strength   Toileting (FIM) 5 - Patient requires supervision/monitoring   Transfer Bed/Chair/Wheelchair   Limitations Noted In Balance; Endurance;Pain Management;LE Strength   Stand Pivot Supervision   Sit to Stand Supervision   Stand to Sit Supervision   Supine to Sit Assist x 1   Sit to Supine Assist x 1   Bed, Chair, Wheelchair Transfer (FIM) 4 - Patient completes 75% of all tasks   QI: Toilet Transfer   Assistance Needed Supervision   Toilet Transfer CARE Score 4   Toilet Transfer   Surface Assessed Raised Toilet   Transfer Technique Stand Pivot   Limitations Noted In Balance; Endurance; Safety;LE Strength;ROM   Toilet Transfer (FIM) 5 - Patient requires supervision/monitoring   Tub/Shower Transfer   Not Assessed Sponge Bath   Ambulation   Does the patient walk? 2   Yes   Assist Device Roller Walker   Walking (FIM) 5 - HOUSEHOLD EXCEPTION: Ambulates 50 feet or more, with or w/o a device, but completely independent   Social Interaction   Cooperation with staff   Participation Individual   Social Interaction (FIM) 5 - Interacts appropriately with others 90% of time   Problem Solving   Problem solving (FIM) 5 - Solves basic problems 90% of time   Memory   Memory (FIM) 6 - Recognizes with extra time   RUE Assessment   RUE Assessment WFL  (hx of sh dislocation although functional)   LUE Assessment   LUE Assessment WFL   Sensation   Light Touch No apparent deficits   Propioception No apparent deficits   Cognition   Orientation Level Oriented X4   Discharge Information   Impressions Pt presents following hospitalization secondary to L TKR  Pt requires assist secondary to decreased balance, endurance, safety and LLE ROM/ strength  Pt will benefit from skilled OT services to increase independence with daily tasks     OT Therapy Minutes   OT Time In 1030   OT Time Out 1130   OT Total Time (minutes) 60   OT Mode of treatment - Individual (minutes) (60)

## 2018-07-16 NOTE — CASE MANAGEMENT
Met with Pt to complete assessment & orientation to [facility]  Pt reported that she was completely independent & is the primary caregiver of family  She resides with her  in a 2 story home, 2 steps in  Has a bedside commode, RW & other DME  Reported that her son may come from [place] to assist with her care upon DC if needed  She would like to use [facility] outpatient PT upon DC  SW will assist as needed with tx & dc planning

## 2018-07-16 NOTE — PCC OCCUPATIONAL THERAPY
7/16/18:  Pt evaluated by OT this day  Requires assist secondary to decreased balance, safety, endurance and LLE ROM and strength with increased pain  Pt's current LOF as listed above  Pt will benefit from skilled OT services to increase independence with daily tasks  7/24/18:  Pt participates in ADLs, transfers/ mobility and BUE therex  Pt has made great progress and is currently I/ Mod I for all above tasks  Pt requires encouragement for confidence with daily tasks  Pt is ready for transuituion to home with family to assist  Recommend RW and 3 in 1 commode

## 2018-07-16 NOTE — PROGRESS NOTES
Progress Note Amy Mathews 1965, 52 y o  female MRN: [id number]    Unit/Bed#: -02 Encounter: [id number]    Primary Care Provider: Marija Negrete DO   Date and time admitted to hospital: 7/14/2018  1:04 PM        * Primary osteoarthritis of left knee   Assessment & Plan    OA L knee s/p L TKA    PT/OT  Celebrex,  OxyIR  DC Tramadol- pte declines  Add Lidoderm patch  Lovenox DVT prophy        Essential hypertension   Assessment & Plan    HTN    Antihypertensives        Leukocytosis   Assessment & Plan    Leucocytosis resolved  Pte c/o malaise, Low grade Temp      CBC/UA/BMP r/o UTI  Vitals:  Temp:  [99 °F (37 2 °C)-100 5 °F (38 1 °C)] 100 °F (37 8 °C)  HR:  [100-108] 101  Resp:  [16-20] 16  BP: (110-162)/(76-90) 110/76    Physical Exam:  General: alert, no apparent distress, cooperative and comfortable  C/O Malaise  HENMT: Head: Normal, normocephalic, atraumatic  Eye: Normal external eye, conjunctiva, lids   Ears: Normal external ears  Nose: Normal external nose, mucus membranes  COR: [id number]  Pulmonary: chest expansion normal, no retractions, no accessory muscle usage, no wheezes, rales, or rhonchi   Abdomen: soft, nontender, nondistended, no masses or organomegaly  Skin/Extremity: L knee incisional dressing dry,intact  Mod edema, calves soft, NT  No increased warmth or erythema  Neurologic: Awake alert orientedx3  Psych: normal mood, behavior, speech, dress, and thought processes  Musculoskeletal: AROM wfl all extremities except L knee limited flexion  Patient is progressing with ADLs and functional mobility, cognition & speech        Current Facility-Administered Medications   Medication Dose Route Frequency Provider Last Rate Last Dose    acetaminophen (TYLENOL) tablet 650 mg  650 mg Oral Q6H PRN ANGELIKA Venegas   650 mg at 07/16/18 0505    aluminum-magnesium hydroxide-simethicone (MYLANTA) 200-200-20 mg/5 mL oral suspension 30 mL  30 mL Oral Q4H PRN Sioban MD Yanet        aspirin chewable tablet 81 mg  81 mg Oral BID With Meals Carlie Carlos MD   81 mg at 07/16/18 0803    bisacodyl (DULCOLAX) rectal suppository 10 mg  10 mg Rectal Daily PRN Carlie Carlos MD        celecoxib (CeleBREX) capsule 200 mg  200 mg Oral BID Carlie Carlos MD   200 mg at 07/16/18 0811    docusate sodium (COLACE) capsule 100 mg  100 mg Oral BID Carlie Carlos MD   100 mg at 07/16/18 0802    docusate sodium (COLACE) capsule 100 mg  100 mg Oral BID PRN ANGELIKA Dickey        enoxaparin (LOVENOX) subcutaneous injection 40 mg  40 mg Subcutaneous Q24H [facility] Carlie Carlos MD   40 mg at 07/16/18 0803    famotidine (PEPCID) tablet 20 mg  20 mg Oral Daily Carlie Carlos MD   20 mg at 07/16/18 0802    valsartan (DIOVAN) tablet 160 mg  160 mg Oral Daily Carlie Carlos MD   160 mg at 07/16/18 0803    And    hydrochlorothiazide (HYDRODIURIL) tablet 25 mg  25 mg Oral Daily Carlie Carlos MD   25 mg at 07/16/18 0803    HYDROmorphone (DILAUDID) tablet 2 mg  2 mg Oral Q4H PRN Evan Loco MD   2 mg at 07/15/18 1415    lidocaine (LIDODERM) 5 % patch 1 patch  1 patch Transdermal Daily Carlie Carlos MD   1 patch at 07/16/18 0803    magnesium hydroxide (MILK OF MAGNESIA) 400 mg/5 mL oral suspension 30 mL  30 mL Oral Daily PRN ANGELIKA Dickey        multivitamin-minerals (CENTRUM) tablet 1 tablet  1 tablet Oral Daily Carlie Carlos MD   1 tablet at 07/16/18 0802    ondansetron (ZOFRAN-ODT) dispersible tablet 4 mg  4 mg Oral Q6H PRN Carlie Carlos MD   4 mg at 07/16/18 0454    oxyCODONE (ROXICODONE) IR tablet 10 mg  10 mg Oral Q4H PRN Evan Loco MD   10 mg at 07/16/18 0646    polyethylene glycol (MIRALAX) packet 17 g  17 g Oral Daily Carlie Carlos MD   17 g at 07/16/18 0803    pregabalin (LYRICA) capsule 100 mg  100 mg Oral BID Carlie Carlos MD   100 mg at 07/16/18 0802    senna (SENOKOT) tablet 17 2 mg  2 tablet Oral HS PRN ANGELIKA Trevino        spironolactone (ALDACTONE) tablet 25 mg  25 mg Oral Daily Katelyn Riley MD   25 mg at 07/16/18 0803    traMADol (ULTRAM) tablet 50 mg  50 mg Oral Q6H PRN Katelyn Riley MD            Laboratory:    Lab Results   Component Value Date    HGB 11 6 (L) 07/15/2018    HCT 34 4 (L) 07/15/2018    WBC 7 80 07/15/2018     Lab Results   Component Value Date    BUN 19 07/15/2018     07/15/2018    K 3 9 07/15/2018     07/15/2018    GLUCOSE 123 (H) 07/15/2018    CREATININE 0 88 07/15/2018     Lab Results   Component Value Date    PROTIME 12 0 06/28/2018    INR 0 93 06/28/2018

## 2018-07-16 NOTE — PLAN OF CARE
Problem: PAIN - ADULT  Goal: Verbalizes/displays adequate comfort level or baseline comfort level  Interventions:  - Encourage patient to monitor pain and request assistance  - Assess pain using appropriate pain scale  - Administer analgesics based on type and severity of pain and evaluate response  - Implement non-pharmacological measures as appropriate and evaluate response  - Consider cultural and social influences on pain and pain management  - Notify physician/advanced practitioner if interventions unsuccessful or patient reports new pain   Outcome: Progressing  Patient's pain will be adequately managed

## 2018-07-16 NOTE — PROGRESS NOTES
Patient is resting quietly in her bed with eyes closed, appears to be sleeping as she did not open her eyes when name was called several times, no distress seen

## 2018-07-16 NOTE — ASSESSMENT & PLAN NOTE
OA L knee s/p L TKA    PT/OT  Celebrex,  OxyIR  DC Tramadol- pte declines  Add Lidoderm patch     Lovenox DVT prophy

## 2018-07-16 NOTE — PROGRESS NOTES
Patient requested to talk to this nurse, reported she had nausea and chills, tympanic temp 100 5, hospitalist ANGELIKA Resendiz notified and new order noted for Tylenol and Zofran  Zofran administered and patient reported it was effective, Tylenol administered-result pending  Message for Dr Angeline Augustine providing above information via physican sticky note through computer system and this information will be communicated to oncoming nurse

## 2018-07-16 NOTE — PROGRESS NOTES
PT EVAL:       07/16/18 0620   Rehab Team Goals   Transfer Team Goal Patient will be independent with transfers with least restrictive device upon completion of rehab program   Locomotion Team Goal Patient will be independent with locomotion with least restrictive device upon completion of rehab program   Rehab Team Interventions   Patient Interventions Gait Training; Therapeutic Exercise;Transfer Training;Community Reintegration;Bed Mobility; Patient/Family Education   PT Transfer Goal   QI: Roll left and right Goal 06  Independent - Patient completes the activity by him/herself with no assistance from a helper  QI: Sit to lying Goal 06  Independent - Patient completes the activity by him/herself with no assistance from a helper  QI: Lying to sitting on side of bed Goal 06  Independent - Patient completes the activity by him/herself with no assistance from a helper  QI: Sit to stand Goal 06  Independent - Patient completes the activity by him/herself with no assistance from a helper  QI: Chair/bed-to-chair transfer Goal 06  Independent - Patient completes the activity by him/herself with no assistance from a helper  QI: Car Transfer Goal 06  Independent - Patient completes the activity by him/herself with no assistance from a helper  FIM Transfer Goal Modified [place]   Assistive Device Roller Walker   Status Discharge Home   Walking Goal   Does the patient walk? 2  Yes   QI: Walk 10 feet Goal 06  Independent - Patient completes the activity by him/herself with no assistance from a helper  QI: Walk 50 feet with 2 turns Goal 06  Independent - Patient completes the activity by him/herself with no assistance from a helper  QI: Walk 150 feet Goal 06  Independent - Patient completes the activity by him/herself with no assistance from a helper  QI: Walking 10 feet on uneven surface 06  Independent - Patient completes the activity by him/herself with no assistance from a helper     FIM Walk Goal Moderate [place]   Assist Device Nancy Soto Walked 150 ft   Environment Level Surface; Uneven Surface; Carpet   Status Discharge Home   Wheelchair Goal   QI: Does the patient use a wheelchair? 0  No   Stairs Goal   QI: 1 step (curb) Goal 05  Setup or clean-up assistance - [place] SETS UP or CLEANS UP, patient completes activity  [place] assists only prior to or following the activity  QI: 4 steps Goal 06  Independent - Patient completes the activity by him/herself with no assistance from a helper  QI: 12 steps Goal 06  Independent - Patient completes the activity by him/herself with no assistance from a helper  Assist Level Moderate [place]   Number of Stairs 13   Technique Non-reciprocal;Curb Step   Hand Rail Left   Status Discharge Home   Object Retrieval Goal   QI: Picking up object Goal 06  Independent - Patient completes the activity by him/herself with no assistance from a helper     Assistive Device  Other (Comment)  (RW)   Small Object Picked Up (cone)

## 2018-07-16 NOTE — PCC PHYSICAL THERAPY
7/16/18:  Patient seen for IE today  Patient with increased complaints of pain and nausea today  Patient supervision bed mobility; cg transfers and gait with RW  Higher level gait not assessed secondary to pain and nausea  Patient would benefit from continued inpatient PT to increase function, safety, and independence with RW in prep for safe d/c to home

## 2018-07-17 PROCEDURE — 97110 THERAPEUTIC EXERCISES: CPT

## 2018-07-17 PROCEDURE — 97530 THERAPEUTIC ACTIVITIES: CPT

## 2018-07-17 PROCEDURE — 97116 GAIT TRAINING THERAPY: CPT

## 2018-07-17 PROCEDURE — 97535 SELF CARE MNGMENT TRAINING: CPT

## 2018-07-17 PROCEDURE — 97537 COMMUNITY/WORK REINTEGRATION: CPT

## 2018-07-17 RX ORDER — METHOCARBAMOL 500 MG/1
500 TABLET, FILM COATED ORAL EVERY 6 HOURS PRN
Status: DISCONTINUED | OUTPATIENT
Start: 2018-07-17 | End: 2018-07-25 | Stop reason: HOSPADM

## 2018-07-17 RX ADMIN — OXYCODONE HYDROCHLORIDE 10 MG: 5 TABLET ORAL at 01:24

## 2018-07-17 RX ADMIN — OXYCODONE HYDROCHLORIDE 10 MG: 5 TABLET ORAL at 07:50

## 2018-07-17 RX ADMIN — ENOXAPARIN SODIUM 40 MG: 40 INJECTION, SOLUTION INTRAVENOUS; SUBCUTANEOUS at 08:44

## 2018-07-17 RX ADMIN — ASPIRIN 81 MG 81 MG: 81 TABLET ORAL at 07:50

## 2018-07-17 RX ADMIN — CELECOXIB 200 MG: 200 CAPSULE ORAL at 13:31

## 2018-07-17 RX ADMIN — SPIRONOLACTONE 25 MG: 25 TABLET ORAL at 08:45

## 2018-07-17 RX ADMIN — METHOCARBAMOL 500 MG: 500 TABLET ORAL at 16:57

## 2018-07-17 RX ADMIN — MULTIPLE VITAMINS W/ MINERALS TAB 1 TABLET: TAB at 08:44

## 2018-07-17 RX ADMIN — ASPIRIN 81 MG 81 MG: 81 TABLET ORAL at 16:57

## 2018-07-17 RX ADMIN — PREGABALIN 100 MG: 100 CAPSULE ORAL at 18:08

## 2018-07-17 RX ADMIN — POLYETHYLENE GLYCOL 3350 17 G: 17 POWDER, FOR SOLUTION ORAL at 08:55

## 2018-07-17 RX ADMIN — FAMOTIDINE 20 MG: 20 TABLET ORAL at 08:45

## 2018-07-17 RX ADMIN — CELECOXIB 200 MG: 200 CAPSULE ORAL at 18:08

## 2018-07-17 RX ADMIN — LIDOCAINE 1 PATCH: 50 PATCH CUTANEOUS at 08:54

## 2018-07-17 RX ADMIN — METHOCARBAMOL 500 MG: 500 TABLET ORAL at 11:13

## 2018-07-17 RX ADMIN — SENNOSIDES 17.2 MG: 8.6 TABLET, FILM COATED ORAL at 21:41

## 2018-07-17 RX ADMIN — DOCUSATE SODIUM 100 MG: 100 CAPSULE, LIQUID FILLED ORAL at 08:46

## 2018-07-17 RX ADMIN — VALSARTAN 160 MG: 160 TABLET, FILM COATED ORAL at 08:44

## 2018-07-17 RX ADMIN — DOCUSATE SODIUM 100 MG: 100 CAPSULE, LIQUID FILLED ORAL at 18:08

## 2018-07-17 RX ADMIN — HYDROCHLOROTHIAZIDE 25 MG: 25 TABLET ORAL at 08:44

## 2018-07-17 RX ADMIN — OXYCODONE HYDROCHLORIDE 10 MG: 5 TABLET ORAL at 13:30

## 2018-07-17 RX ADMIN — PREGABALIN 100 MG: 100 CAPSULE ORAL at 08:44

## 2018-07-17 RX ADMIN — METHOCARBAMOL 500 MG: 500 TABLET ORAL at 23:42

## 2018-07-17 RX ADMIN — ACETAMINOPHEN 650 MG: 325 TABLET ORAL at 04:36

## 2018-07-17 RX ADMIN — ONDANSETRON 4 MG: 4 TABLET, ORALLY DISINTEGRATING ORAL at 11:13

## 2018-07-17 RX ADMIN — OXYCODONE HYDROCHLORIDE 10 MG: 5 TABLET ORAL at 21:44

## 2018-07-17 NOTE — PROGRESS NOTES
Progress Note Deonte Kwong 1965, 52 y o  female MRN: [id number]    Unit/Bed#: -02 Encounter: [id number]    Primary Care Provider: Kb Briscoe DO   Date and time admitted to hospital: 7/14/2018  1:04 PM        * Primary osteoarthritis of left knee   Assessment & Plan    OA L knee s/p L TKA     PT/OT  Celebrex,  OxyIR  DC Tramadol- pte declines  Add Lidoderm patch  Lovenox DVT prophy        Essential hypertension   Assessment & Plan    Essential hypertension   Assessment & Plan     HTN     Antihypertensives               Vitals:  Temp:  [98 3 °F (36 8 °C)-100 1 °F (37 8 °C)] 98 3 °F (36 8 °C)  HR:  [94-95] 95  Resp:  [16-20] 20  BP: (148-160)/(90) 148/90    Physical Exam:  General: alert, c/o severe L knee pain  Has avoided taking adequate pain meds  HENMT: Head: Normal, normocephalic, atraumatic  Eye: Normal external eye, conjunctiva, lids   Ears: Normal external ears  Nose: Normal external nose, mucus membranes  COR: [id number]  Pulmonary: chest expansion normal, no retractions, no accessory muscle usage, no wheezes, rales, or rhonchi   Abdomen: soft, nontender, nondistended, no masses or organomegaly  Skin/Extremity: L knee incision C/D/Intact, Mod edema, no calf tenderness  Neurologic: Awake alert orientedx3  Psych: normal mood, behavior, speech, dress, and thought processes  Musculoskeletal: AROM wfl all extremities  Patient is progressing with ADLs and functional mobility, cognition & speech  Team conference held 35 min  Greater than 50% of time spent coordinating/ counseling patient care        Current Facility-Administered Medications   Medication Dose Route Frequency Provider Last Rate Last Dose    acetaminophen (TYLENOL) tablet 650 mg  650 mg Oral Q6H PRN ANGELIKA King   650 mg at 07/17/18 0436    aluminum-magnesium hydroxide-simethicone (MYLANTA) 200-200-20 mg/5 mL oral suspension 30 mL  30 mL Oral Q4H PRN James Krishna MD        aspirin chewable tablet 81 mg 81 mg Oral BID With Meals Foreign Delgado MD   81 mg at 07/17/18 0750    bisacodyl (DULCOLAX) rectal suppository 10 mg  10 mg Rectal Daily PRN Foreign Delgado MD        celecoxib (CeleBREX) capsule 200 mg  200 mg Oral BID Foreign Delgado MD   200 mg at 07/16/18 1708    docusate sodium (COLACE) capsule 100 mg  100 mg Oral BID Foreign Delgado MD   100 mg at 07/17/18 0846    docusate sodium (COLACE) capsule 100 mg  100 mg Oral BID PRN ANGELIKA Dickey        enoxaparin (LOVENOX) subcutaneous injection 40 mg  40 mg Subcutaneous Q24H [address] Foreign Delgado MD   40 mg at 07/17/18 0844    famotidine (PEPCID) tablet 20 mg  20 mg Oral Daily Foreign Delgado MD   20 mg at 07/17/18 0845    valsartan (DIOVAN) tablet 160 mg  160 mg Oral Daily Foreign Delgado MD   160 mg at 07/17/18 0844    And    hydrochlorothiazide (HYDRODIURIL) tablet 25 mg  25 mg Oral Daily Foreign Delgado MD   25 mg at 07/17/18 0844    HYDROmorphone (DILAUDID) tablet 2 mg  2 mg Oral Q4H PRN Ryanne Severino MD   2 mg at 07/15/18 1415    lidocaine (LIDODERM) 5 % patch 1 patch  1 patch Transdermal Daily Foreign Delgado MD   1 patch at 07/17/18 0854    magnesium hydroxide (MILK OF MAGNESIA) 400 mg/5 mL oral suspension 30 mL  30 mL Oral Daily PRN ANGELIKA Dickey        multivitamin-minerals (CENTRUM) tablet 1 tablet  1 tablet Oral Daily Foreign Delgado MD   1 tablet at 07/17/18 0844    ondansetron (ZOFRAN-ODT) dispersible tablet 4 mg  4 mg Oral Q6H PRN Foreign Delgado MD   4 mg at 07/16/18 1130    oxyCODONE (ROXICODONE) IR tablet 10 mg  10 mg Oral Q4H PRN Ryanne Severino MD   10 mg at 07/17/18 0750    polyethylene glycol (MIRALAX) packet 17 g  17 g Oral Daily Foreign Delgado MD   17 g at 07/17/18 0855    pregabalin (LYRICA) capsule 100 mg  100 mg Oral BID Foreign Delgado MD   100 mg at 07/17/18 0844    senna (SENOKOT) tablet 17 2 mg  2 tablet Oral HS PRN ANGELIKA Lara        spironolactone (ALDACTONE) tablet 25 mg  25 mg Oral Daily Marco Antonio Pabon MD   25 mg at 07/17/18 0845    traMADol (ULTRAM) tablet 50 mg  50 mg Oral Q6H PRN Marco Antonio Pabon MD            Laboratory:    Lab Results   Component Value Date    HGB 11 4 (L) 07/16/2018    HCT 33 6 (L) 07/16/2018    WBC 9 30 07/16/2018     Lab Results   Component Value Date    BUN 19 07/16/2018     07/16/2018    K 3 8 07/16/2018    CL 98 07/16/2018    GLUCOSE 123 (H) 07/16/2018    CREATININE 0 89 07/16/2018     Lab Results   Component Value Date    PROTIME 12 0 06/28/2018    INR 0 93 06/28/2018

## 2018-07-17 NOTE — PCC NURSING
Pt having difficulty with pain tolerance  Support & encouragement given  Robaxin given this a m , pain med regimen reviewed by Dr Loren Galvan, new orders noted  Pt  Refusing to go home due to "too much pain"  Dr Loren Galvan and  spoke with pt  Pt will agree to go home today, pain meds given as ordered, continue to monitor

## 2018-07-17 NOTE — TEAM CONFERENCE
Acute RehabilitationTeam Conference Note  Date: 7/17/2018   Time: 11:50 AM       Patient Name:  Mert Gil       Medical Record Number: [id number]   YOB: 1965  Sex: Female          Room/Bed:  [facility] [place] 216/[facility] [place] 216-02  Payor Info:  Payor: Hayley Rota / Plan: [facility] PLAN 361 / Product Type: Blue Fee for Service /      Admitting Diagnosis: History of orthopedic surgery [[id number] [id number]]   Admit Date/Time:  7/14/2018  1:04 PM  Admission Comments: No comment available     Primary Diagnosis:  Primary osteoarthritis of left knee  Principal Problem: Primary osteoarthritis of left knee    Patient Active Problem List    Diagnosis Date Noted    Essential hypertension 07/12/2018    Leukocytosis 07/12/2018    Primary osteoarthritis of left knee 07/11/2018       Physical Therapy:    Weight Bearing Status: Weight Bearing as Tolerated  Transfers: Contact Guard  Bed Mobility: Supervision  Amulation Distance (ft): 25 feet  Ambulation: Contact Guard  Assistive Device for Ambulation: Roller Walker  Discharge Recommendations: Home Independently  [address] with[de-identified] Outpatient Physical Therapy    7/16/18:  Patient seen for IE today  Patient with increased complaints of pain and nausea today  Patient supervision bed mobility; cg transfers and gait with RW  Higher level gait not assessed secondary to pain and nausea  Patient would benefit from continued inpatient PT to increase function, safety, and independence with RW in prep for safe d/c to home  Occupational Therapy:  Eating: Independent  Grooming: Supervision  Bathing: Minimal Assistance  Bathing: Minimal Assistance  Upper Body Dressing: Supervision  Lower Body Dressing: Maximum Assistance  Toileting: Supervision  Tub/Shower Transfer:  (TBA)  Toilet Transfer: Supervision  Cognition: Within Defined Limits  Orientation: Person, Place, Time, Situation  Discharge Recommendations: Home with:  [address] with[de-identified] Family Support       7/16/18:  Pt evaluated by OT this day   Requires assist secondary to decreased balance, safety, endurance and LLE ROM and strength with increased pain  Pt's current LOF as listed above  Pt will benefit from skilled OT services to increase independence with daily tasks  Speech Therapy:           No notes on file    Nursing Notes:  Appetite: Fair  Diet Type: Regular/House                      Diet Patient/Family Education Complete: No    Type of Wound (LDA): Incision (drsg removed this a m , no drng, GERARDO)           Incision 07/11/18 Knee Left (Active)   Incision Description Clean;Dry; Intact 7/16/2018  8:11 AM   [place]-wound Assessment Clean;Dry; Intact 7/16/2018  8:11 AM   Closure RUFINA 7/16/2018  8:11 AM   Drainage Amount None 7/16/2018  8:11 AM   Treatments Other (Comment) 7/11/2018 10:22 AM   Dressing Pressure dressing 7/16/2018  8:11 AM   Patient Tolerance Tolerated well 7/16/2018  8:11 AM   Dressing Status Clean;Dry; Intact 7/16/2018  8:11 AM           Type of Wound Patient/Family Education: Yes  Bladder: 4 - Minimal Assistance     Bladder Patient/Family Education: Yes  Bowel: 6 - Modified [place]     Bowel Patient/Family Education: Yes  Pain Location: Knee, Leg  Pain Orientation: Left  Pain Score: 7                       Hospital Pain Intervention(s): Medication (See MAR), Cold applied  Pain Patient/Family Education: Yes  Medication Management/Safety  New Medication:  (robaxin)  Safe Administration: Yes    No notes on file    Case Management:     Discharge Planning  Goal Length of Stay: 7  Living Arrangements: Spouse/significant other, Children  Support Systems: Spouse/significant other, Children  Assistance Needed: none  Type of Current Residence: Private residence  Current Home Care Services: No  No notes on file    Is the patient actively participating in therapies?  yes  List any modifications to the treatment plan: na    Barriers Interventions   Decreased balance ADL, transfer, gait training   Decreased strength/ROM Therapeutic exercise, therapeutic activity                 Is the patient making expected progress toward goals? yes  List any update or changes to goals: na     Medical Goals: Patient will be medically stable for discharge to [facility] upon completion of rehab program    Weekly Team Goals:   Rehab Team Goals  ADL Team Goal: Patient will be independent with ADLs with least restrictive device upon completion of rehab program  Bowel/Bladder Team Goal: Patient will be independent with bladder/bowel management with least restrictive device upon completion of rehab program  Transfer Team Goal: Patient will be independent with transfers with least restrictive device upon completion of rehab program  Locomotion Team Goal: Patient will be independent with locomotion with least restrictive device upon completion of rehab program  Cognitive Team Goal: Patient will return to premorbid level of cognitive activity upon completion of rehab program     Mod I self care, transfers, and mobility    Discussion: Requires encouragement in therapy  Staff feels patient can progress to return home with family        Anticipated Discharge Date:  July 24, 2018

## 2018-07-17 NOTE — PROGRESS NOTES
07/17/18 1145   Pain Assessment   Pain Assessment 0-10   Pain Score Worst Possible Pain   Pain Type Surgical pain   Pain Location Knee   Pain Orientation Left   Grooming   Able To Initiate Tasks;Comb/Brush Hair;Wash/Dry Face;Brush/Clean Teeth;Wash/Dry Hands   Limitation Noted In Safety   Grooming (FIM) 5 - [place] sets up supplies or applies device   Bathing   Assessed Bath Style Sponge Bath   Anticipated D/C Bath Style Shower   Able to [place] No   Able to [employer] Temperature No   Able to Wash/Rinse/Dry (body part) Left Arm;Right Arm;L Upper Leg;R Upper Leg;Chest;Abdomen;Perineal Area; Buttocks   Bathing (FIM) 4 - Patient completes 8/10 or 9/10 parts   Tub/Shower Transfer   Not Assessed Patient refusal;Sponge Bath   Dressing/Undressing Clothing   Remove LB Clothes Shoes; Undergarment   Don LB Clothes Undergarment;Socks   UB Dressing (FIM) 5 - [place] sets up supplies or applies device   LB Dressing (FIM) 4 - Patient completes 75% of all tasks   Transfer Bed/Chair/Wheelchair   Limitations Noted In Balance; Endurance;Pain Management;LE Strength   Stand Pivot Supervision   Sit to Stand Supervision   Stand to Sit Supervision   Supine to Sit Supervision   Sit to Supine Supervision   Bed, Chair, Wheelchair Transfer (FIM) 5 - [place] sets up equipment or applies device such as orthosis/prosthesis   Toileting   Able to Pull Clothing down yes, up yes  Manage Hygiene Bladder; Bowel   Limitations Noted In Balance;ROM;Safety;LE Strength   Toileting (FIM) 5 - Patient requires supervision/monitoring   Toilet Transfer   Surface Assessed Raised Toilet   Transfer Technique Stand Pivot   Limitations Noted In Balance; Endurance;ROM;Safety;LE Strength   Toilet Transfer (FIM) 5 - Patient requires supervision/monitoring   Exercise Tools   Other Exercise Tool 1 (1#wt RUE, 2#wt LUE 2 sets 15 shoulder to elbow)   Other Exercise Tool 2 (red gripper 2 sets 30)   Assessment   Treatment Assessment Pt present supine ready for an ADL but declining a shower because of pain and not feeling well  Pt began ADL EOB although abbreviated session due to pain and need to lay down  LB A/E issued although not used this day  therex BUE completed supine with pt declining to attend OT and ice pack issued for pain  Pt completes LB dressing and toileting later in session  Problem List Decreased strength;Decreased range of motion;Decreased endurance; Impaired balance;Decreased safety awareness   Plan   Treatment/Interventions ADL retraining;Functional transfer training;LE strengthening/ROM; Therapeutic exercise; Endurance training;Patient/family training; Compensatory technique education   Progress Improving as expected   OT Therapy Minutes   OT Time In 0930   OT Time Out 1100   OT Total Time (minutes) 90   OT Mode of treatment - Individual (minutes) (30)   OT Mode of treatment - Concurrent (minutes) (60)   Therapy Time missed   Time missed?  No

## 2018-07-17 NOTE — PROGRESS NOTES
07/17/18 0715   Pain Assessment   Pain Assessment 0-10   Pain Score 9   Pain Type Surgical pain   Pain Location Knee   Pain Orientation Left   Pain Descriptors Stabbing   Pain Frequency Constant/continuous   Clinical Progression Gradually improving   Hospital Pain Intervention(s) Medication (See MAR); Cold applied   Cognition   Overall Cognitive Status WFL   Arousal/Participation Alert; Responsive   Attention Within functional limits   Orientation Level Oriented X4   Memory Within functional limits   Following Commands Follows all commands and directions without difficulty   Subjective   Subjective My knee hurt so much and overnight I get hot and cold I must have a fever   Therapeutic Interventions   Strengthening Supine ther ex 30 reps as able   Assessment   Treatment Assessment Pt completed supine ther ex to increase ROM and strength B LE as able; Pt has limited payam to exercise on L LE   PT Barriers   Physical Impairment Decreased strength;Decreased range of motion;Decreased endurance;Decreased mobility;Pain   Plan   Treatment/Interventions LE strengthening/ROM; Therapeutic exercise   Progress Slow progress, decreased activity tolerance   PT Therapy Minutes   PT Time In 0715   PT Time Out 0745   PT Total Time (minutes) 30   PT Mode of treatment - Individual (minutes) 0   PT Mode of treatment - Concurrent (minutes) 30   PT Mode of treatment - Group (minutes) 0   PT Mode of treatment - Co-treat (minutes) 0   PT Mode of Teatment - Total time(minutes) 30 minutes   Therapy Time missed   Time missed?  No

## 2018-07-17 NOTE — PROGRESS NOTES
PT PROGRESS NOTE:       07/17/18 1300   Pain Assessment   Pain Assessment 0-10   Pain Score 7   Pain Type Surgical pain   Pain Location Leg   Pain Orientation Left   Cognition   Overall Cognitive Status WFL   Orientation Level Oriented X4   Bed Mobility   Able to Roll Left to Right;Right to Left;Scoot Up;Scoot Down   Findings supervision   Transfer Bed/Chair/Wheelchair   Limitations Noted In Balance;Pain Management;LE Strength   Adaptive Equipment Roller Walker   Stand Pivot Contact Guard   Sit to [facility]   Stand to [facility]   Supine to Sit Supervision   Sit to Supine Supervision   Bed, Chair, Wheelchair Transfer (FIM) 4 - Patient requires steadying assist or light touching   Ambulation   Primary Discharge Mode of Locomotion Walk   Walk Assist Level Contact Guard   Gait Pattern Antalgic; Inconsistant Mabel   Assist Device Roller Walker   Distance Walked (feet) (12' 82')   Limitations Noted In Balance;Posture; Safety   Walking (FIM) 2 - Patient ambulates between 50 - 149 feet regardless of assist/device/set up   Stairs   Type Curb;Ramp   # of Steps (2 curb steps )   Assist Devices Roller Walker   Findings (cg/min assist curb steps with RW; cg ramp with RW)   Therapeutic Interventions   Strengthening seated ther ex 3 x 10   Balance gait and transfer training   Assessment   Treatment Assessment Patient completed therapy but with numerous complaints of pain and discomfort in knee  Encouraged patient to try and move knee as much as possible as that will help with overall mobility  Completed seated ther ex for increased ROM/strength; gait and transfer training with focus on technique for improved balance and safety with functional mobility   PT Barriers   Physical Impairment Decreased strength;Decreased range of motion;Decreased mobility;Pain   Functional Limitation Car transfers; Ramp negotiation;Stair negotiation;Standing;Transfers; Walking   Plan   Treatment/Interventions Functional transfer training;LE strengthening/ROM; Elevations; Therapeutic exercise; Bed mobility;Gait training   Progress Progressing toward goals   PT Therapy Minutes   PT Time In 1300   PT Time Out 1400   PT Total Time (minutes) 60   PT Mode of treatment - Individual (minutes) 0   PT Mode of treatment - Concurrent (minutes) 60   PT Mode of treatment - Group (minutes) 0   PT Mode of treatment - Co-treat (minutes) 0   PT Mode of Teatment - Total time(minutes) 60 minutes   Therapy Time missed   Time missed?  No

## 2018-07-18 PROCEDURE — 97110 THERAPEUTIC EXERCISES: CPT

## 2018-07-18 PROCEDURE — 97530 THERAPEUTIC ACTIVITIES: CPT

## 2018-07-18 PROCEDURE — 97116 GAIT TRAINING THERAPY: CPT

## 2018-07-18 PROCEDURE — 97537 COMMUNITY/WORK REINTEGRATION: CPT

## 2018-07-18 RX ORDER — LIDOCAINE 50 MG/G
1 PATCH TOPICAL DAILY
Status: DISCONTINUED | OUTPATIENT
Start: 2018-07-18 | End: 2018-07-25 | Stop reason: HOSPADM

## 2018-07-18 RX ADMIN — METHOCARBAMOL 500 MG: 500 TABLET ORAL at 11:59

## 2018-07-18 RX ADMIN — ACETAMINOPHEN 650 MG: 325 TABLET ORAL at 03:46

## 2018-07-18 RX ADMIN — FAMOTIDINE 20 MG: 20 TABLET ORAL at 08:29

## 2018-07-18 RX ADMIN — PREGABALIN 100 MG: 100 CAPSULE ORAL at 08:29

## 2018-07-18 RX ADMIN — POLYETHYLENE GLYCOL 3350 17 G: 17 POWDER, FOR SOLUTION ORAL at 08:29

## 2018-07-18 RX ADMIN — ENOXAPARIN SODIUM 40 MG: 40 INJECTION, SOLUTION INTRAVENOUS; SUBCUTANEOUS at 08:28

## 2018-07-18 RX ADMIN — DOCUSATE SODIUM 100 MG: 100 CAPSULE, LIQUID FILLED ORAL at 17:15

## 2018-07-18 RX ADMIN — MULTIPLE VITAMINS W/ MINERALS TAB 1 TABLET: TAB at 08:29

## 2018-07-18 RX ADMIN — SPIRONOLACTONE 25 MG: 25 TABLET ORAL at 08:29

## 2018-07-18 RX ADMIN — OXYCODONE HYDROCHLORIDE 10 MG: 5 TABLET ORAL at 09:32

## 2018-07-18 RX ADMIN — LIDOCAINE 1 PATCH: 50 PATCH CUTANEOUS at 08:29

## 2018-07-18 RX ADMIN — METHOCARBAMOL 500 MG: 500 TABLET ORAL at 05:43

## 2018-07-18 RX ADMIN — OXYCODONE HYDROCHLORIDE 10 MG: 5 TABLET ORAL at 21:42

## 2018-07-18 RX ADMIN — PREGABALIN 100 MG: 100 CAPSULE ORAL at 17:15

## 2018-07-18 RX ADMIN — CELECOXIB 200 MG: 200 CAPSULE ORAL at 17:15

## 2018-07-18 RX ADMIN — CELECOXIB 200 MG: 200 CAPSULE ORAL at 09:32

## 2018-07-18 RX ADMIN — OXYCODONE HYDROCHLORIDE 10 MG: 5 TABLET ORAL at 14:33

## 2018-07-18 RX ADMIN — DOCUSATE SODIUM 100 MG: 100 CAPSULE, LIQUID FILLED ORAL at 05:45

## 2018-07-18 RX ADMIN — ONDANSETRON 4 MG: 4 TABLET, ORALLY DISINTEGRATING ORAL at 08:44

## 2018-07-18 RX ADMIN — ASPIRIN 81 MG 81 MG: 81 TABLET ORAL at 08:28

## 2018-07-18 RX ADMIN — HYDROCHLOROTHIAZIDE 25 MG: 25 TABLET ORAL at 08:29

## 2018-07-18 RX ADMIN — LIDOCAINE 1 PATCH: 50 PATCH TOPICAL at 11:59

## 2018-07-18 RX ADMIN — DOCUSATE SODIUM 100 MG: 100 CAPSULE, LIQUID FILLED ORAL at 08:28

## 2018-07-18 RX ADMIN — OXYCODONE HYDROCHLORIDE 10 MG: 5 TABLET ORAL at 05:27

## 2018-07-18 RX ADMIN — ASPIRIN 81 MG 81 MG: 81 TABLET ORAL at 17:15

## 2018-07-18 RX ADMIN — VALSARTAN 160 MG: 160 TABLET, FILM COATED ORAL at 08:28

## 2018-07-18 RX ADMIN — METHOCARBAMOL 500 MG: 500 TABLET ORAL at 21:42

## 2018-07-18 NOTE — PROGRESS NOTES
PT PROGRESS NOTE:       07/18/18 0800   Pain Assessment   Pain Assessment 0-10   Pain Score 8   Pain Type Surgical pain   Pain Location Knee   Pain Orientation Left   Cognition   Overall Cognitive Status WFL   Orientation Level Oriented X4   Subjective   Subjective "I feel better today "   Bed Mobility   Able to Roll Left to Right;Right to Left;Scoot Up;Scoot Down   Adaptive Equipment Side Rails   Findings (s/mod I)   Transfer Bed/Chair/Wheelchair   Limitations Noted In Balance;Pain Management;LE Strength   Adaptive Equipment Roller Walker   Stand Pivot Supervision   Sit to Stand Supervision   Stand to Sit Supervision   Supine to Sit Supervision   Bed, Chair, Wheelchair Transfer (FIM) 5 - Patient requires verbal cues   Ambulation   Primary Discharge Mode of Locomotion Walk   Walk Assist Level Supervision   Gait Pattern Antalgic   Assist Device Roller Walker   Distance Walked (feet) (120' x 2)   Limitations Noted In Balance; Safety;Strength   Findings (level and carpet)   Walking (FIM) 2 - Patient ambulates between 50 - 149 feet regardless of assist/device/set up   Stairs   Type Stairs;Curb   Assist Devices Roller Walker   Findings 2 curb steps close s with RW; cg 5 steps with RW    Stairs (FIM) 2 - Patient goes up and down 4 - 11 stairs regardless of assist/device/setup   Therapeutic Interventions   Strengthening supine and seated ther ex 3 x 10   Balance gait (level and unlevel) and transfer training   Assessment   Treatment Assessment Tolerating therapy better today with decreased complaints of pain  Completed ther ex for increased ROM/strength; gait and transfer training with focus on technique for improved balance and safety with functional mobility using RW  PT Barriers   Physical Impairment Decreased strength;Decreased range of motion;Decreased endurance; Impaired balance;Pain   Functional Limitation Car transfers; Ramp negotiation;Stair negotiation;Standing;Transfers; Walking   Plan   Treatment/Interventions Functional transfer training;LE strengthening/ROM; Elevations; Therapeutic exercise; Bed mobility;Gait training   Progress Progressing toward goals   PT Therapy Minutes   PT Time In 0800   PT Time Out 0930   PT Total Time (minutes) 90   PT Mode of treatment - Individual (minutes) 90   PT Mode of treatment - Concurrent (minutes) 0   PT Mode of treatment - Group (minutes) 0   PT Mode of treatment - Co-treat (minutes) 0   PT Mode of Teatment - Total time(minutes) 90 minutes   Therapy Time missed   Time missed?  No

## 2018-07-18 NOTE — PLAN OF CARE
DISCHARGE PLANNING     Discharge to home or other facility with appropriate resources Progressing        HEMATOLOGIC - ADULT     Maintains hematologic stability Progressing        INFECTION - ADULT     Absence or prevention of progression during hospitalization Progressing     Absence of fever/infection during neutropenic period Progressing        MUSCULOSKELETAL - ADULT     Maintain or return mobility to safest level of function Progressing     Maintain proper alignment of affected body part Progressing        Nutrition/Hydration-ADULT     Nutrient/Hydration intake appropriate for improving, restoring or maintaining nutritional needs Progressing        PAIN - ADULT     Verbalizes/displays adequate comfort level or baseline comfort level Progressing        Potential for Falls     Patient will remain free of falls Progressing        Prexisting or High Potential for Compromised Skin Integrity     Skin integrity is maintained or improved Progressing        SAFETY ADULT     Patient will remain free of falls Progressing     Maintain or return to baseline ADL function Progressing     Maintain or return mobility status to optimal level Progressing        SKIN/TISSUE INTEGRITY - ADULT     Skin integrity remains intact Progressing     Incision(s), wounds(s) or drain site(s) healing without S/S of infection Progressing     Oral mucous membranes remain intact Progressing

## 2018-07-18 NOTE — PROGRESS NOTES
07/18/18 1330   Exercise Tools   Other Exercise Tool 1 (1# wt RUE and 2# LUE for shoulder to elbow 2 sets 15)   Assessment   Treatment Assessment Pt tolerates exercises without complaints  Shower planned for tomorrow to increase independence with daily tasks  Plan   Treatment/Interventions ADL retraining;Functional transfer training;LE strengthening/ROM; Therapeutic exercise; Endurance training;Patient/family training; Compensatory technique education   Progress Slow progress, decreased activity tolerance   OT Therapy Minutes   OT Time In 1330   OT Time Out 1400   OT Total Time (minutes) 30   OT Mode of treatment - Concurrent (minutes) (30)   Therapy Time missed   Time missed?  No

## 2018-07-18 NOTE — NURSING NOTE
Patient out of bed with limited assist times one  Patient in pain throughout shift and complains of nerve pain in left upper thigh area  Incision clean, dry,intact to left knee  Lidoderm to left knee and ice applied  Patient encouraged to participate as much as possible and move in bed  Will continue to monitor and follow plan of care

## 2018-07-18 NOTE — PROGRESS NOTES
07/18/18 1000   Pain Assessment   Pain Assessment 0-10   Pain Score 7   Pain Type Surgical pain   Pain Location Knee   Pain Orientation Left   Transfer Bed/Chair/Wheelchair   Stand Pivot Supervision   Sit to Stand Supervision   Stand to Sit Supervision   Supine to Sit Supervision   Bed, Chair, Wheelchair Transfer (FIM) 5 - Patient requires supervision/monitoring   Exercise Tools   Other Exercise Tool 1 (Gripper with pegs BUE)   Other Exercise Tool 2 (Functional reacher use Mod I pegs)   Other Exercise Tool 3 (Sanderbox 2# 2 sets 15 all planes)   Assessment   Treatment Assessment Pt participates in OT services completing 30 minutes concurrent treatment focusing on UB strengthening with similar goals as another  Pt enjoys getting up and coming to gym with pain resolving slowly  Problem List Decreased strength;Decreased range of motion;Decreased endurance; Impaired balance;Decreased safety awareness;Pain   Plan   Treatment/Interventions ADL retraining;Functional transfer training;LE strengthening/ROM; Therapeutic exercise; Endurance training;Patient/family training; Compensatory technique education   Progress Slow progress, decreased activity tolerance   OT Therapy Minutes   OT Time In 1000   OT Time Out 1100   OT Total Time (minutes) 60   OT Mode of treatment - Individual (minutes) (30)   OT Mode of treatment - Concurrent (minutes) (30)   Therapy Time missed   Time missed?  No

## 2018-07-18 NOTE — PROGRESS NOTES
Progress Note Jose C Staley 1965, 52 y o  female MRN: [id number]    Unit/Bed#: [facility] 216-02 Encounter: [id number]    Primary Care Provider: Leonore Aschoff, DO   Date and time admitted to hospital: 7/14/2018  1:04 PM        * Primary osteoarthritis of left knee   Assessment & Plan    OA L knee s/p L TKA  Doing better with Robaxin  C/O L thigh tingling/pain  PT/OT  Celebrex,  OxyIR  DC Tramadol- pte declines  Add Lidoderm patch  Lovenox DVT prophy        Essential hypertension   Assessment & Plan    HTN    Antihypertensives        Leukocytosis   Assessment & Plan    Leucocytosis resolved  Pte c/o malaise, Low grade Temp resolved      CBC/UA/BMP normal             Vitals:  Temp:  [98 7 °F (37 1 °C)-99 °F (37 2 °C)] 98 7 °F (37 1 °C)  HR:  [] 100  Resp:  [16-20] 20  BP: (124-138)/(76-90) 124/76    Physical Exam:  General: alert, no apparent distress, cooperative and comfortable  HENMT: Head: Normal, normocephalic, atraumatic  Eye: Normal external eye, conjunctiva, lids   Ears: Normal external ears  Nose: Normal external nose, mucus membranes  COR: [id number]  Pulmonary: chest expansion normal, no retractions, no accessory muscle usage, no wheezes, rales, or rhonchi   Abdomen: soft, nontender, nondistended, no masses or organomegaly  Skin/Extremity: L knee incision C/D/Intact, Mod edema, no calf tenderness  Neurologic: Awake alert orientedx3  Psych: normal mood, behavior, speech, dress, and thought processes  Musculoskeletal: AROM wfl all extremities except L knee rom limited by pain  Patient is progressing with ADLs and functional mobility, cognition & speech        Current Facility-Administered Medications   Medication Dose Route Frequency Provider Last Rate Last Dose    acetaminophen (TYLENOL) tablet 650 mg  650 mg Oral Q6H PRN ANGELIKA Meier   650 mg at 07/18/18 0346    aluminum-magnesium hydroxide-simethicone (MYLANTA) 200-200-20 mg/5 mL oral suspension 30 mL  30 mL Oral Q4H PRN Hans Xiao MD        aspirin chewable tablet 81 mg  81 mg Oral BID With Meals Hans Xiao MD   81 mg at 07/18/18 0828    bisacodyl (DULCOLAX) rectal suppository 10 mg  10 mg Rectal Daily PRN Hans Xiao MD        celecoxib (CeleBREX) capsule 200 mg  200 mg Oral BID Hans Xiao MD   200 mg at 07/18/18 0932    docusate sodium (COLACE) capsule 100 mg  100 mg Oral BID Hans Xiao MD   100 mg at 07/18/18 0828    docusate sodium (COLACE) capsule 100 mg  100 mg Oral BID PRN ANGELIKA Jacques   100 mg at 07/18/18 0545    enoxaparin (LOVENOX) subcutaneous injection 40 mg  40 mg Subcutaneous Q24H [address] Hans Xiao MD   40 mg at 07/18/18 0828    famotidine (PEPCID) tablet 20 mg  20 mg Oral Daily Hans Xiao MD   20 mg at 07/18/18 0829    valsartan (DIOVAN) tablet 160 mg  160 mg Oral Daily Hans Xiao MD   160 mg at 07/18/18 1290    And    hydrochlorothiazide (HYDRODIURIL) tablet 25 mg  25 mg Oral Daily Hans Xiao MD   25 mg at 07/18/18 0829    lidocaine (LIDODERM) 5 % patch 1 patch  1 patch Transdermal Daily Hans Xiao MD   1 patch at 07/18/18 0829    magnesium hydroxide (MILK OF MAGNESIA) 400 mg/5 mL oral suspension 30 mL  30 mL Oral Daily PRN ANGELIKA Dickey        methocarbamol (ROBAXIN) tablet 500 mg  500 mg Oral Q6H PRN Hans Xiao MD   500 mg at 07/18/18 0543    multivitamin-minerals (CENTRUM) tablet 1 tablet  1 tablet Oral Daily Hans Xiao MD   1 tablet at 07/18/18 0829    ondansetron (ZOFRAN-ODT) dispersible tablet 4 mg  4 mg Oral Q6H PRN Hans Xiao MD   4 mg at 07/18/18 0844    oxyCODONE (ROXICODONE) IR tablet 10 mg  10 mg Oral Q4H PRN Valdo Mendenhall MD   10 mg at 07/18/18 0932    polyethylene glycol (MIRALAX) packet 17 g  17 g Oral Daily Hans Xiao MD   17 g at 07/18/18 0829    pregabalin (LYRICA) capsule 100 mg  100 mg Oral BID Hans Xiao MD   100 mg at 07/18/18 0829    senna ([address]) tablet 17 2 mg  2 tablet Oral HS PRN ANGELIKA Menard   17 2 mg at 07/17/18 2141    spironolactone (ALDACTONE) tablet 25 mg  25 mg Oral Daily Trupti Sanon MD   25 mg at 07/18/18 0829    traMADol (ULTRAM) tablet 50 mg  50 mg Oral Q6H PRN Trupti Sanon MD            Laboratory:    Lab Results   Component Value Date    HGB 11 4 (L) 07/16/2018    HCT 33 6 (L) 07/16/2018    WBC 9 30 07/16/2018     Lab Results   Component Value Date    BUN 19 07/16/2018     07/16/2018    K 3 8 07/16/2018    CL 98 07/16/2018    GLUCOSE 123 (H) 07/16/2018    CREATININE 0 89 07/16/2018     Lab Results   Component Value Date    PROTIME 12 0 06/28/2018    INR 0 93 06/28/2018

## 2018-07-18 NOTE — ASSESSMENT & PLAN NOTE
OA L knee s/p L TKA  Doing better with Robaxin  C/O L thigh tingling/pain  PT/OT  Celebrex,  OxyIR  DC Tramadol- pte declines  Add Lidoderm patch     Lovenox DVT prophy

## 2018-07-18 NOTE — PCC CARE MANAGEMENT
Treatment team recommendations reviewed with Pt; she declined for this worker to contact her family to discuss DC planning  Target DC date is 7/24  Pt expressed preference for outpatient PT with [facility] in [facility] upon DC  SW will monitor & assist as needed with Tx & DC planning     7/24/18- Tx team rec reviewed  Pt to be [address] home today, see CM notes

## 2018-07-18 NOTE — CASE MANAGEMENT
Treatment team recommendations reviewed with Pt; she declined for this worker to contact her family to discuss DC planning  Target DC date is 7/24  Pt expressed preference for outpatient PT with [facility] in [facility] upon DC  SW will monitor & assist as needed with Tx & DC planning

## 2018-07-18 NOTE — PLAN OF CARE
Problem: Nutrition/Hydration-ADULT  Goal: Nutrient/Hydration intake appropriate for improving, restoring or maintaining nutritional needs  Monitor and assess patient's nutrition/hydration status for malnutrition (ex- brittle hair, bruises, dry skin, pale skin and conjunctiva, muscle wasting, smooth red tongue, and disorientation)  Collaborate with interdisciplinary team and initiate plan and interventions as ordered  Monitor patient's weight and dietary intake as ordered or per policy  Utilize nutrition screening tool and intervene per policy  Determine patient's food preferences and provide high-protein, high-caloric foods as appropriate  INTERVENTIONS:  - Monitor oral intake, urinary output, labs, and treatment plans  - Assess nutrition and hydration status and recommend course of action  - Evaluate amount of meals eaten  - Assist patient with eating if necessary   - Allow adequate time for meals  - Recommend/ encourage appropriate diets, oral nutritional supplements, and vitamin/mineral supplements  - Order, calculate, and assess calorie counts as needed  - Recommend, monitor, and adjust tube feedings and TPN/PPN based on assessed needs  - Assess need for intravenous fluids  - Provide specific nutrition/hydration education as appropriate  - Include patient/family/caregiver in decisions related to nutrition  Outcome: Adequate for Discharge      Comments: Pt drinking and eating well  Pt not accepting of nutrition education at this time

## 2018-07-19 PROCEDURE — 97530 THERAPEUTIC ACTIVITIES: CPT

## 2018-07-19 PROCEDURE — 97535 SELF CARE MNGMENT TRAINING: CPT

## 2018-07-19 PROCEDURE — 97110 THERAPEUTIC EXERCISES: CPT

## 2018-07-19 RX ORDER — ZOLPIDEM TARTRATE 5 MG/1
5 TABLET ORAL
Status: DISCONTINUED | OUTPATIENT
Start: 2018-07-19 | End: 2018-07-20

## 2018-07-19 RX ADMIN — CELECOXIB 200 MG: 200 CAPSULE ORAL at 08:45

## 2018-07-19 RX ADMIN — LIDOCAINE 1 PATCH: 50 PATCH CUTANEOUS at 08:57

## 2018-07-19 RX ADMIN — SPIRONOLACTONE 25 MG: 25 TABLET ORAL at 08:46

## 2018-07-19 RX ADMIN — METHOCARBAMOL 500 MG: 500 TABLET ORAL at 12:43

## 2018-07-19 RX ADMIN — DOCUSATE SODIUM 100 MG: 100 CAPSULE, LIQUID FILLED ORAL at 17:17

## 2018-07-19 RX ADMIN — POLYETHYLENE GLYCOL 3350 17 G: 17 POWDER, FOR SOLUTION ORAL at 08:45

## 2018-07-19 RX ADMIN — LIDOCAINE 1 PATCH: 50 PATCH TOPICAL at 08:45

## 2018-07-19 RX ADMIN — OXYCODONE HYDROCHLORIDE 10 MG: 5 TABLET ORAL at 06:23

## 2018-07-19 RX ADMIN — ENOXAPARIN SODIUM 40 MG: 40 INJECTION, SOLUTION INTRAVENOUS; SUBCUTANEOUS at 08:44

## 2018-07-19 RX ADMIN — CELECOXIB 200 MG: 200 CAPSULE ORAL at 17:19

## 2018-07-19 RX ADMIN — METHOCARBAMOL 500 MG: 500 TABLET ORAL at 21:03

## 2018-07-19 RX ADMIN — ASPIRIN 81 MG 81 MG: 81 TABLET ORAL at 17:17

## 2018-07-19 RX ADMIN — ACETAMINOPHEN 650 MG: 325 TABLET ORAL at 01:55

## 2018-07-19 RX ADMIN — ZOLPIDEM TARTRATE 5 MG: 5 TABLET, FILM COATED ORAL at 23:29

## 2018-07-19 RX ADMIN — FAMOTIDINE 20 MG: 20 TABLET ORAL at 08:45

## 2018-07-19 RX ADMIN — OXYCODONE HYDROCHLORIDE 10 MG: 5 TABLET ORAL at 21:04

## 2018-07-19 RX ADMIN — MULTIPLE VITAMINS W/ MINERALS TAB 1 TABLET: TAB at 08:44

## 2018-07-19 RX ADMIN — PREGABALIN 100 MG: 100 CAPSULE ORAL at 17:17

## 2018-07-19 RX ADMIN — VALSARTAN 160 MG: 160 TABLET, FILM COATED ORAL at 08:44

## 2018-07-19 RX ADMIN — METHOCARBAMOL 500 MG: 500 TABLET ORAL at 06:22

## 2018-07-19 RX ADMIN — OXYCODONE HYDROCHLORIDE 10 MG: 5 TABLET ORAL at 02:26

## 2018-07-19 RX ADMIN — ASPIRIN 81 MG 81 MG: 81 TABLET ORAL at 08:44

## 2018-07-19 RX ADMIN — HYDROCHLOROTHIAZIDE 25 MG: 25 TABLET ORAL at 08:44

## 2018-07-19 RX ADMIN — OXYCODONE HYDROCHLORIDE 10 MG: 5 TABLET ORAL at 10:34

## 2018-07-19 RX ADMIN — DOCUSATE SODIUM 100 MG: 100 CAPSULE, LIQUID FILLED ORAL at 08:44

## 2018-07-19 RX ADMIN — PREGABALIN 100 MG: 100 CAPSULE ORAL at 08:45

## 2018-07-19 NOTE — ASSESSMENT & PLAN NOTE
OA L knee s/p L TKA  Doing better with Robaxin  C/O L thigh tingling/pain  c/o Insomnia  PT/OT  Celebrex,  OxyIR  DC Tramadol- pte declines  Add Lidoderm patch     Lovenox DVT prophy  Ambien for insomnia

## 2018-07-19 NOTE — NURSING NOTE
Resting in bed  Awake on/off during shift, occasional moaning present due to pain in right knee medicated appropriately as ordered  Educated pt about importance of medicating for pain prior to therapy sessions, prn robaxin and oxy admin this AM therapy session at 0730  All items within reach to pt  Continuing to monitor

## 2018-07-19 NOTE — PROGRESS NOTES
Progress Note Mihaela Mail 1965, 52 y o  female MRN: [id number]    Unit/Bed#: -02 Encounter: [id number]    Primary Care Provider: Ananya Apodaca DO   Date and time admitted to hospital: 7/14/2018  1:04 PM        * Primary osteoarthritis of left knee   Assessment & Plan    OA L knee s/p L TKA  Doing better with Robaxin  C/O L thigh tingling/pain  c/o Insomnia  PT/OT  Celebrex,  OxyIR  DC Tramadol- pte declines  Add Lidoderm patch  Lovenox DVT prophy  Ambien for insomnia        Essential hypertension   Assessment & Plan    HTN    Antihypertensives        Leukocytosis   Assessment & Plan    Leucocytosis resolved  Pte c/o malaise, Low grade Temp resolved      CBC/UA/BMP normal             Vitals:  Temp:  [95 5 °F (35 3 °C)-98 9 °F (37 2 °C)] 95 5 °F (35 3 °C)  HR:  [] 92  Resp:  [20] 20  BP: (116-130)/(60-80) 130/80    Physical Exam:  General: alert, no apparent distress, cooperative and comfortable  HENMT: Head: Normal, normocephalic, atraumatic  Eye: Normal external eye, conjunctiva, lids   Ears: Normal external ears  Nose: Normal external nose, mucus membranes  COR: [id number]  Pulmonary: chest expansion normal, no retractions, no accessory muscle usage, no wheezes, rales, or rhonchi   Abdomen: soft, nontender, nondistended, no masses or organomegaly  Skin/Extremity: L knee incision C/D/Intact, Mod edema, no calf tenderness  Neurologic: Awake alert orientedx3  Psych: normal mood, behavior, speech, dress, and thought processes  Musculoskeletal: AROM wfl all extremities except L knee rom limited by pain  Patient is progressing with ADLs and functional mobility, cognition & speech        Current Facility-Administered Medications   Medication Dose Route Frequency Provider Last Rate Last Dose    acetaminophen (TYLENOL) tablet 650 mg  650 mg Oral Q6H PRN ANGELIKA Dickey   650 mg at 07/19/18 0155    aluminum-magnesium hydroxide-simethicone (MYLANTA) 200-200-20 mg/5 mL oral suspension 30 mL  30 mL Oral Q4H PRN Marilee Ward MD        aspirin chewable tablet 81 mg  81 mg Oral BID With Meals Marilee Ward MD   81 mg at 07/19/18 0844    bisacodyl (DULCOLAX) rectal suppository 10 mg  10 mg Rectal Daily PRN Marilee Ward MD        celecoxib (CeleBREX) capsule 200 mg  200 mg Oral BID Marilee Ward MD   200 mg at 07/19/18 0845    docusate sodium (COLACE) capsule 100 mg  100 mg Oral BID Marilee Ward MD   100 mg at 07/19/18 0844    docusate sodium (COLACE) capsule 100 mg  100 mg Oral BID PRN ANGELIKA Barreto   100 mg at 07/18/18 0545    enoxaparin (LOVENOX) subcutaneous injection 40 mg  40 mg Subcutaneous Q24H [address] Marilee Ward MD   40 mg at 07/19/18 0844    famotidine (PEPCID) tablet 20 mg  20 mg Oral Daily Marilee Ward MD   20 mg at 07/19/18 0845    valsartan (DIOVAN) tablet 160 mg  160 mg Oral Daily Marilee Ward MD   160 mg at 07/19/18 0844    And    hydrochlorothiazide (HYDRODIURIL) tablet 25 mg  25 mg Oral Daily Marilee Ward MD   25 mg at 07/19/18 0844    lidocaine (LIDODERM) 5 % patch 1 patch  1 patch Transdermal Daily Marilee Ward MD   1 patch at 07/19/18 0857    lidocaine (LIDODERM) 5 % patch 1 patch  1 patch Transdermal Daily Marilee Ward MD   Stopped at 07/19/18 0854    magnesium hydroxide (MILK OF MAGNESIA) 400 mg/5 mL oral suspension 30 mL  30 mL Oral Daily PRN ANGELIKA Dickey        methocarbamol (ROBAXIN) tablet 500 mg  500 mg Oral Q6H PRN Marilee Ward MD   500 mg at 07/19/18 1243    multivitamin-minerals (CENTRUM) tablet 1 tablet  1 tablet Oral Daily Marilee Ward MD   1 tablet at 07/19/18 0844    ondansetron (ZOFRAN-ODT) dispersible tablet 4 mg  4 mg Oral Q6H PRN Marilee Ward MD   4 mg at 07/18/18 0844    oxyCODONE (ROXICODONE) IR tablet 10 mg  10 mg Oral Q4H PRN Sushma Tolliver MD   10 mg at 07/19/18 1038    polyethylene glycol (MIRALAX) packet 17 g  17 g Oral Daily Marilee Ward MD 17 g at 07/19/18 0845    pregabalin (LYRICA) capsule 100 mg  100 mg Oral BID Trent Majano MD   100 mg at 07/19/18 0845    senna (SENOKOT) tablet 17 2 mg  2 tablet Oral HS PRN Altamese Herlinda Jay JayANGELIKA   17 2 mg at 07/17/18 2141    spironolactone (ALDACTONE) tablet 25 mg  25 mg Oral Daily Trent Majano MD   25 mg at 07/19/18 0846    traMADol (ULTRAM) tablet 50 mg  50 mg Oral Q6H PRN Trent Majano MD            Laboratory:    Lab Results   Component Value Date    HGB 11 4 (L) 07/16/2018    HCT 33 6 (L) 07/16/2018    WBC 9 30 07/16/2018     Lab Results   Component Value Date    BUN 19 07/16/2018     07/16/2018    K 3 8 07/16/2018    CL 98 07/16/2018    GLUCOSE 123 (H) 07/16/2018    CREATININE 0 89 07/16/2018     Lab Results   Component Value Date    PROTIME 12 0 06/28/2018    INR 0 93 06/28/2018

## 2018-07-19 NOTE — PROGRESS NOTES
07/19/18 0930   Grooming   Limitation Noted In Safety   Grooming (FIM) 5 - [place] sets up supplies or applies device   Bathing   Assessed Bath Style Shower   Anticipated D/C Bath Style Shower   Able to [place] No   Able to [employer] Temperature No   Able to Wash/Rinse/Dry (body part) Left Arm;Right Arm;R Upper Leg;L Upper Leg;L Lower Leg/Foot;R Lower Leg/Foot; Abdomen; Chest;Buttocks; Perineal Area   Limitations Noted in Balance;ROM;Safety;Strength; Endurance   Bathing (FIM) 5 - Patient requires supervision/monitoring but completes 10/10 parts   Tub/Shower Transfer   Limitations Noted In Balance;ROM;Safety;LE Strength   Shower Transfer (FIM) 4 - Patient requires steadying assist or light touching   Dressing/Undressing Clothing   Remove UB Clothes Pullover Shirt   Remove LB Clothes Undergarment;Socks   [address] LB Clothes Pants;Socks; Undergarment   Limitations Noted In Balance; Safety;Strength;ROM   UB Dressing (FIM) 5 - Patient requires supervision/monitoring   LB Dressing (FIM) 5 - Patient requires supervision/monitoring   Transfer Bed/Chair/Wheelchair   Limitations Noted In Balance;Pain Management;LE Strength   Stand Pivot Supervision   Sit to Stand Supervision   Stand to Sit Supervision   Supine to Sit Supervision   Bed, Chair, Wheelchair Transfer (FIM) 5 - Patient requires supervision/monitoring   Exercise Tools   Other Exercise Tool 1 (5# gripper 30 times BUE)   Other Exercise Tool 2 (2#wt LUE and 1#wt RUE 2 sets 15 sh to wrist)   Other Exercise Tool 3 (yellow flex bar 2 sets 15 up/ down)   Assessment   Treatment Assessment Pt participates in a shower this am with the LLE covered to keep incision dry  Pt tolerates session better today with pain controlled more effectively with increased activity  30 minutes concurrent treatment with similar goals as another to increase independence  Problem List Decreased strength;Decreased range of motion;Decreased endurance; Impaired balance;Decreased safety awareness;Pain   Plan   Treatment/Interventions ADL retraining;Functional transfer training;LE strengthening/ROM; Therapeutic exercise; Endurance training;Patient/family training; Compensatory technique education   Progress Progressing toward goals   OT Therapy Minutes   OT Time In 0930   OT Time Out 1100   OT Total Time (minutes) 90   OT Mode of treatment - Individual (minutes) (60)   OT Mode of treatment - Concurrent (minutes) (30)   Therapy Time missed   Time missed?  No

## 2018-07-19 NOTE — TREATMENT PLAN
Individualized Plan of [address] 52 y o  female MRN: [id number]  Unit/Bed#: [facility] 683-50 Encounter: [id number]     PATIENT INFORMATION  ADMISSION DATE: 7/14/2018  1:04 PM WES CATEGORY:Orthopedic Disorders:  08 61  Unilateral Knee Replacement   ADMISSION DIAGNOSIS: History of orthopedic surgery [Z98 [id number]]  EXPECTED LOS: 10-14 days     MEDICAL/FUNCTIONAL PROGNOSIS  Based on my assessment of the patient's medical conditions and current functional status, the prognosis for attaining medical and functional goals or the IRF stay is:  Good    Medical Goals: Patient will be medically stable for discharge to [facility] upon completion of rehab program    [address]: Home - with supervision    Is a 24-hr caregiver available? No  Has discharge plan been discussed with primary caregiver? Yes  Date of Discussion: unknown date    ANTICIPATED FOLLOW-UP SERVICE:   Outpatient Therapy Services: PT            DISCIPLINE SPECIFIC PLANS:  Required Disciplines & Services:pt,ot    REQUIRED THERAPY:  Therapy Hours per Day Days per Week Total Days   Physical Therapy 1 5 5 5   Occupational Therapy 1 5 5 5   NOTE: Additional therapy time(s) may be added as appropriate to meet patient needs and to achieve functional goals  REQUIRED THERAPY:  Therapy Hours per Day Days per Week Total Days   Physical Therapy 1 5 5   Occupational Therapy 1 5 5   Speech/Language Therapy 1 5 5   NOTE: Additional therapy time(s) may be added as appropriate to meet patient needs and to achieve functional goals    2  Patient will either participate in above therapy regimen or participate in 900 minutes of therapy within 7 day week consisting of PT and OT due to the following medical procedure/condition:Orthopedic Disorders:  08 61  Unilateral Knee Replacement    ANTICIPATED FUNCTIONAL OUTCOMES:  ADL: Patient will be independent with ADLs with least restrictive device upon completion of rehab program   Bladder/Bowel: Patient will be independent with bladder/bowel management with least restrictive device upon completion of rehab program   Transfers: Patient will be independent with transfers with least restrictive device upon completion of rehab program   Locomotion: Patient will be independent with locomotion with least restrictive device upon completion of rehab program   Cognitive: Patient will return to premorbid level of cognitive activity upon completion of rehab program     DISCHARGE PLANNING NEEDS  Equipment needs: tbd      REHAB ANTICIPATED PARTICIPATION RESTRICTIONS:  assist with bathing

## 2018-07-19 NOTE — PROGRESS NOTES
07/19/18 1230   Pain Assessment   Pain Assessment 0-10   Pain Score 8   Pain Type Surgical pain   Pain Location Knee   Pain Orientation Left   Pain Frequency Constant/continuous   Pain Onset Ongoing   Clinical Progression Gradually improving   Hospital Pain Intervention(s) Medication (See MAR); Cold applied   Restrictions/Precautions   Weight Bearing Restrictions No   LUE Weight Bearing Per Order WBAT   LLE Weight Bearing Per Order WBAT   Cognition   Overall Cognitive Status WFL   Arousal/Participation Alert; Responsive; Cooperative   Attention Within functional limits   Orientation Level Oriented X4   Memory Within functional limits   Following Commands Follows all commands and directions without difficulty   Bed Mobility   Able to Roll Left to Right;Right to Left;Scoot Up;Scoot Down   Adaptive Equipment Side Rails   Transfer Bed/Chair/Wheelchair   Stand Pivot Supervision   Sit to Stand Supervision   Stand to Sit Supervision   Supine to Sit Supervision   Sit to Supine Supervision   All Transfer Supervision   Bed, Chair, Wheelchair Transfer (FIM) 5 - Patient requires supervision/monitoring   Ambulation   Primary Discharge Mode of Locomotion Walk   Walk Assist Level Supervision   Assist Device Roller Walker   Distance Walked (feet) 120 ft   Walking (FIM) 2 - Patient ambulates between 50 - 149 feet regardless of assist/device/set up   Wheelchair mobility   QI: Does the patient use a wheelchair? 0  No   Stairs   Type Stairs   # of Steps 5   Assist Devices Single Rail   Stairs (FIM) 2 - Patient goes up and down 4 - 11 stairs regardless of assist/device/setup   Therapeutic Interventions   Strengthening Seated and supine ther ex x 30 reps   Assessment   Treatment Assessment Pt cooperative  Pt did experience some fatigue and increased pain at end of session     PT Barriers   Physical Impairment Decreased strength;Decreased range of motion;Decreased endurance;Pain   Functional Limitation Walking;Transfers   Plan Treatment/Interventions Functional transfer training;LE strengthening/ROM; Therapeutic exercise; Endurance training;Gait training   Recommendation   Recommendation Short-term skilled PT   PT Therapy Minutes   PT Time In 1230   PT Time Out 1400   PT Total Time (minutes) 90   PT Mode of treatment - Individual (minutes) 90   PT Mode of treatment - Concurrent (minutes) 0   PT Mode of treatment - Group (minutes) 0   PT Mode of treatment - Co-treat (minutes) 0   PT Mode of Teatment - Total time(minutes) 90 minutes   Therapy Time missed   Time missed?  No

## 2018-07-19 NOTE — PLAN OF CARE
PAIN - ADULT     Verbalizes/displays adequate comfort level or baseline comfort level Not Progressing        Patient with pain score remaining at 8 or 9  DISCHARGE PLANNING     Discharge to home or other facility with appropriate resources Progressing        HEMATOLOGIC - ADULT     Maintains hematologic stability Progressing        INFECTION - ADULT     Absence or prevention of progression during hospitalization Progressing     Absence of fever/infection during neutropenic period Progressing        MUSCULOSKELETAL - ADULT     Maintain or return mobility to safest level of function Progressing     Maintain proper alignment of affected body part Progressing        Nutrition/Hydration-ADULT     Nutrient/Hydration intake appropriate for improving, restoring or maintaining nutritional needs Progressing        Potential for Falls     Patient will remain free of falls Progressing        Prexisting or High Potential for Compromised Skin Integrity     Skin integrity is maintained or improved Progressing        SAFETY ADULT     Patient will remain free of falls Progressing     Maintain or return to baseline ADL function Progressing     Maintain or return mobility status to optimal level Progressing        SKIN/TISSUE INTEGRITY - ADULT     Skin integrity remains intact Progressing     Incision(s), wounds(s) or drain site(s) healing without S/S of infection Progressing     Oral mucous membranes remain intact Progressing

## 2018-07-19 NOTE — NURSING NOTE
Patient out of bed supervision assist times one  Medicated for pain in left knee  Incision clean, dry, intact with wound adhesive  Lidoderm patches applied to side of incision  Patient using ice on and off of incision  Complains of nerve pain in her upper thigh  Needs motivation to participate in care  Will continue to monitor and follow plan of care

## 2018-07-20 PROCEDURE — 97535 SELF CARE MNGMENT TRAINING: CPT

## 2018-07-20 PROCEDURE — 97116 GAIT TRAINING THERAPY: CPT

## 2018-07-20 PROCEDURE — 97530 THERAPEUTIC ACTIVITIES: CPT

## 2018-07-20 PROCEDURE — 97110 THERAPEUTIC EXERCISES: CPT

## 2018-07-20 PROCEDURE — 97537 COMMUNITY/WORK REINTEGRATION: CPT

## 2018-07-20 RX ORDER — ZOLPIDEM TARTRATE 10 MG/1
10 TABLET ORAL
Status: DISCONTINUED | OUTPATIENT
Start: 2018-07-20 | End: 2018-07-25 | Stop reason: HOSPADM

## 2018-07-20 RX ADMIN — DOCUSATE SODIUM 100 MG: 100 CAPSULE, LIQUID FILLED ORAL at 17:13

## 2018-07-20 RX ADMIN — VALSARTAN 160 MG: 160 TABLET, FILM COATED ORAL at 09:24

## 2018-07-20 RX ADMIN — OXYCODONE HYDROCHLORIDE 10 MG: 5 TABLET ORAL at 19:41

## 2018-07-20 RX ADMIN — OXYCODONE HYDROCHLORIDE 10 MG: 5 TABLET ORAL at 02:58

## 2018-07-20 RX ADMIN — DOCUSATE SODIUM 100 MG: 100 CAPSULE, LIQUID FILLED ORAL at 09:21

## 2018-07-20 RX ADMIN — ASPIRIN 81 MG 81 MG: 81 TABLET ORAL at 17:13

## 2018-07-20 RX ADMIN — OXYCODONE HYDROCHLORIDE 10 MG: 5 TABLET ORAL at 07:31

## 2018-07-20 RX ADMIN — SPIRONOLACTONE 25 MG: 25 TABLET ORAL at 09:22

## 2018-07-20 RX ADMIN — CELECOXIB 200 MG: 200 CAPSULE ORAL at 17:11

## 2018-07-20 RX ADMIN — MULTIPLE VITAMINS W/ MINERALS TAB 1 TABLET: TAB at 09:23

## 2018-07-20 RX ADMIN — OXYCODONE HYDROCHLORIDE 10 MG: 5 TABLET ORAL at 12:34

## 2018-07-20 RX ADMIN — METHOCARBAMOL 500 MG: 500 TABLET ORAL at 02:59

## 2018-07-20 RX ADMIN — PREGABALIN 100 MG: 100 CAPSULE ORAL at 09:21

## 2018-07-20 RX ADMIN — PREGABALIN 100 MG: 100 CAPSULE ORAL at 17:13

## 2018-07-20 RX ADMIN — METHOCARBAMOL 500 MG: 500 TABLET ORAL at 17:13

## 2018-07-20 RX ADMIN — ENOXAPARIN SODIUM 40 MG: 40 INJECTION, SOLUTION INTRAVENOUS; SUBCUTANEOUS at 09:21

## 2018-07-20 RX ADMIN — FAMOTIDINE 20 MG: 20 TABLET ORAL at 09:23

## 2018-07-20 RX ADMIN — LIDOCAINE 1 PATCH: 50 PATCH TOPICAL at 09:19

## 2018-07-20 RX ADMIN — LIDOCAINE 1 PATCH: 50 PATCH CUTANEOUS at 09:20

## 2018-07-20 RX ADMIN — CELECOXIB 200 MG: 200 CAPSULE ORAL at 09:21

## 2018-07-20 RX ADMIN — HYDROCHLOROTHIAZIDE 25 MG: 25 TABLET ORAL at 09:21

## 2018-07-20 RX ADMIN — ZOLPIDEM TARTRATE 10 MG: 10 TABLET, FILM COATED ORAL at 23:07

## 2018-07-20 RX ADMIN — POLYETHYLENE GLYCOL 3350 17 G: 17 POWDER, FOR SOLUTION ORAL at 09:21

## 2018-07-20 RX ADMIN — ASPIRIN 81 MG 81 MG: 81 TABLET ORAL at 07:32

## 2018-07-20 RX ADMIN — METHOCARBAMOL 500 MG: 500 TABLET ORAL at 09:23

## 2018-07-20 NOTE — PROGRESS NOTES
07/20/18 1240   Transfer Bed/Chair/Wheelchair   Limitations Noted In Balance; Endurance   Stand Pivot Supervision   Sit to Stand Supervision   Stand to Sit Supervision   Supine to Sit Supervision   Sit to Supine Supervision   Bed, Chair, Wheelchair Transfer (FIM) 5 - Patient requires supervision/monitoring   Functional Standing Tolerance   Time (3-4 minutes S for tabletop tasks with RW)   Exercise Tools   Other Exercise Tool 1 (yellow flex bar 2 sets 15 up/down)   Other Exercise Tool 2 (Gripper 7# 2 sets 30 BUE)   Coordination   Fine Motor (card match standing)   Additional Activities   Additional Activities Other (Comment)  (functional mobility with S and RW 50 feet)   Assessment   Treatment Assessment Pt participates in 30 minutes concurrent treatment during UB therex focusing on strength and ROM  Pt tolerates session without complaints and improving tolerance to activities noted  Problem List Decreased strength;Decreased range of motion;Decreased endurance; Impaired balance;Decreased safety awareness;Pain   Plan   Treatment/Interventions ADL retraining;Functional transfer training;LE strengthening/ROM; Therapeutic exercise; Endurance training;Patient/family training; Compensatory technique education   Progress Improving as expected   OT Therapy Minutes   OT Time In 1240   OT Time Out 1310   OT Total Time (minutes) 30   OT Mode of treatment - Concurrent (minutes) (30)   Therapy Time missed   Time missed?  No

## 2018-07-20 NOTE — PROGRESS NOTES
PT PROGRESS NOTE:       07/20/18 0722   Pain Assessment   Pain Assessment 0-10   Pain Score 9   Pain Location Knee   Pain Orientation Left   Cognition   Overall Cognitive Status WFL   Orientation Level Oriented X4   Therapeutic Interventions   Strengthening supine ther ex 3 x 10   Assessment   Treatment Assessment Patient continues with increased  complaints of pain L knee  Tolerated therapy session well  Completed bilateral LE supine ther ex with increasedd ROM noted in L knee  PT Barriers   Physical Impairment Decreased strength;Decreased range of motion;Decreased endurance; Impaired balance;Decreased mobility;Pain   Plan   Treatment/Interventions LE strengthening/ROM   Progress Slow progress, decreased activity tolerance   PT Therapy Minutes   PT Time In 0722   PT Time Out 0752   PT Total Time (minutes) 30   PT Mode of treatment - Individual (minutes) 30   PT Mode of treatment - Concurrent (minutes) 0   PT Mode of treatment - Group (minutes) 0   PT Mode of treatment - Co-treat (minutes) 0   PT Mode of Teatment - Total time(minutes) 30 minutes   Therapy Time missed   Time missed?  No

## 2018-07-20 NOTE — PROGRESS NOTES
AAO X 4, pleasant, medicated for c/o L knee pain, left knee incision has no S/S of infection, no drainage, GERARDO, well approximated, presently resting quietly in her bed with eyes closed, no distress seen, call bell within easy reach, will continue frequent monitoring

## 2018-07-20 NOTE — PROGRESS NOTES
Progress Note Allen Aguilera 1965, 52 y o  female MRN: [id number]    Unit/Bed#: [facility] 216-02 Encounter: [id number]    Primary Care Provider: Maty Gibbs DO   Date and time admitted to hospital: 7/14/2018  1:04 PM        * Primary osteoarthritis of left knee   Assessment & Plan    OA L knee s/p L TKA  Doing better with Robaxin  C/O L thigh tingling/pain  c/o Insomnia  PT/OT  Celebrex,  OxyIR  DC Tramadol- pte declines  Add Lidoderm patch  Lovenox DVT prophy  Ambien for insomnia        Essential hypertension   Assessment & Plan    HTN    Antihypertensives        Leukocytosis   Assessment & Plan    Leucocytosis resolved  Pte c/o malaise, Low grade Temp resolved      CBC/UA/BMP normal             Vitals:  Temp:  [98 4 °F (36 9 °C)-99 5 °F (37 5 °C)] 98 4 °F (36 9 °C)  HR:  [96-99] 96  Resp:  [16] 16  BP: (116-144)/(60-84) 144/84    Physical Exam:  General: alert, no apparent distress, cooperative and comfortable  Slept with [facility] but awoke a few times  HENMT: Head: Normal, normocephalic, atraumatic  Eye: Normal external eye, conjunctiva, lids   Ears: Normal external ears  Nose: Normal external nose, mucus membranes  COR: [id number]  Pulmonary: chest expansion normal, no retractions, no accessory muscle usage, no wheezes, rales, or rhonchi   Abdomen: soft, nontender, nondistended, no masses or organomegaly  Skin/Extremity: L knee incision c/d/glued/intact  Mod edema, calves soft, NT  Neurologic: Awake alert orientedx3  Psych: normal mood, behavior, speech, dress, and thought processes  Musculoskeletal: AROM wfl all extremities except L knee AROM limited by pain  Patient is progressing with ADLs and functional mobility, cognition & speech        Current Facility-Administered Medications   Medication Dose Route Frequency Provider Last Rate Last Dose    acetaminophen (TYLENOL) tablet 650 mg  650 mg Oral Q6H PRN Altamese Herlinda SILVA GoyalNP   650 mg at 07/19/18 0155    aluminum-magnesium hydroxide-simethicone (MYLANTA) 200-200-20 mg/5 mL oral suspension 30 mL  30 mL Oral Q4H PRN Dru Weaver MD        aspirin chewable tablet 81 mg  81 mg Oral BID With Meals Dru Weaver MD   81 mg at 07/20/18 0732    bisacodyl (DULCOLAX) rectal suppository 10 mg  10 mg Rectal Daily PRN Dru Weaver MD        celecoxib (CeleBREX) capsule 200 mg  200 mg Oral BID Dru Weaver MD   200 mg at 07/19/18 1719    docusate sodium (COLACE) capsule 100 mg  100 mg Oral BID Dru Weaver MD   100 mg at 07/19/18 1717    docusate sodium (COLACE) capsule 100 mg  100 mg Oral BID PRN ANGELIKA Dickey   100 mg at 07/18/18 0545    enoxaparin (LOVENOX) subcutaneous injection 40 mg  40 mg Subcutaneous Q24H [facility] Dru Weaver MD   40 mg at 07/19/18 0844    famotidine (PEPCID) tablet 20 mg  20 mg Oral Daily Dru Weaver MD   20 mg at 07/19/18 0845    valsartan (DIOVAN) tablet 160 mg  160 mg Oral Daily Dru Weaver MD   160 mg at 07/19/18 0844    And    hydrochlorothiazide (HYDRODIURIL) tablet 25 mg  25 mg Oral Daily Dru Weaver MD   25 mg at 07/19/18 0844    lidocaine (LIDODERM) 5 % patch 1 patch  1 patch Transdermal Daily Dru Weaver MD   1 patch at 07/19/18 0857    lidocaine (LIDODERM) 5 % patch 1 patch  1 patch Transdermal Daily Dru Weaver MD   Stopped at 07/19/18 0854    magnesium hydroxide (MILK OF MAGNESIA) 400 mg/5 mL oral suspension 30 mL  30 mL Oral Daily PRN ANGELIKA Dickey        methocarbamol (ROBAXIN) tablet 500 mg  500 mg Oral Q6H PRN Dru Weaver MD   500 mg at 07/20/18 0259    multivitamin-minerals (CENTRUM) tablet 1 tablet  1 tablet Oral Daily Dru Weaver MD   1 tablet at 07/19/18 0844    ondansetron (ZOFRAN-ODT) dispersible tablet 4 mg  4 mg Oral Q6H PRN Dru Weaver MD   4 mg at 07/18/18 0844    oxyCODONE (ROXICODONE) IR tablet 10 mg  10 mg Oral Q4H PRN Kylah Zapata MD   10 mg at 07/20/18 0731    polyethylene glycol (MIRALAX) packet 17 g  17 g Oral Daily Donell Mcintosh MD   17 g at 07/19/18 0845    pregabalin (LYRICA) capsule 100 mg  100 mg Oral BID Donell Mcintosh MD   100 mg at 07/19/18 1717    senna (SENOKOT) tablet 17 2 mg  2 tablet Oral HS PRN ANGELIKA Meier   17 2 mg at 07/17/18 2141    spironolactone (ALDACTONE) tablet 25 mg  25 mg Oral Daily Donell Mcintosh MD   25 mg at 07/19/18 0846    traMADol (ULTRAM) tablet 50 mg  50 mg Oral Q6H PRN Donell Mcintosh MD        zolpidem (AMBIEN) tablet 10 mg  10 mg Oral HS PRN Donell Mcintosh MD            Laboratory:    Lab Results   Component Value Date    HGB 11 4 (L) 07/16/2018    HCT 33 6 (L) 07/16/2018    WBC 9 30 07/16/2018     Lab Results   Component Value Date    BUN 19 07/16/2018     07/16/2018    K 3 8 07/16/2018    CL 98 07/16/2018    GLUCOSE 123 (H) 07/16/2018    CREATININE 0 89 07/16/2018     Lab Results   Component Value Date    PROTIME 12 0 06/28/2018    INR 0 93 06/28/2018

## 2018-07-20 NOTE — PROGRESS NOTES
07/20/18 5149   Pain Assessment   Pain Assessment 0-10   Pain Score 7   Pain Type Surgical pain   Pain Location Knee   Pain Orientation Left   Grooming   Able To Initiate Tasks;Comb/Brush Hair;Wash/Dry Face;Brush/Clean Teeth;Wash/Dry Hands   Grooming (FIM) 5 - [employer] sets up supplies or applies device   Bathing   Assessed Bath Style Shower  (incision covered to keep dry)   Anticipated D/C Bath Style Shower   Able to [place] No   Able to [employer] Temperature No   Able to Wash/Rinse/Dry (body part) Left Arm;Right Arm;L Upper Leg;R Upper Leg;L Lower Leg/Foot;R Lower Leg/Foot;Chest;Abdomen;Perineal Area; Buttocks   Limitations Noted in Balance;ROM;Safety   Bathing (FIM) 5 - [employer]wagen sets up supplies or applies device but patient completes 10/10 parts   Tub/Shower Transfer   Limitations Noted In Balance;ROM;Safety   Adaptive Equipment Seat with out Back   Shower Transfer (FIM) 5 - Patient requires supervision/monitoring   Dressing/Undressing Clothing   Remove UB Clothes Pullover Shirt   Remove LB Clothes Pants; Undergarment;Socks   Don UB [address] [place] [address]; Undergarment;Socks   Limitations Noted In Balance; Safety;ROM;Strength   UB Dressing (FIM) 5 - [employer] sets up supplies or applies device   LB Dressing (FIM) 5 - Patient requires supervision/monitoring   Transfer Bed/Chair/Wheelchair   Limitations Noted In Balance;Pain Management;LE Strength   Stand Pivot Supervision   Sit to Stand Supervision   Stand to Sit Supervision   Supine to Sit Supervision   Bed, Chair, Wheelchair Transfer (FIM) 5 - Patient requires supervision/monitoring   Exercise Tools   Other Exercise Tool 1 (1# RUE and 2 # LUE 2 sets 15 shoulder to elbow)   Additional Activities   Additional Activities Other (Comment)  (functional mobility with RW 75 feet S)   Assessment   Treatment Assessment Pt presents supine agreeable to a shower  Pain normalizing in general although reports continued discomfort and nerve pain  Incision covered for shower and pt able to bath/ dress with setup and supervision and A/E  30 minutes concurrent treatment with another patient with similar goals to increase independence with daily tasks  Session completed in room with JULIÁN servin with 1# wt RUE and 2#wt LUE  Problem List Decreased strength;Decreased range of motion;Decreased endurance; Impaired balance;Decreased safety awareness;Pain   Plan   Treatment/Interventions ADL retraining;Functional transfer training;LE strengthening/ROM; Therapeutic exercise; Endurance training;Patient/family training; Compensatory technique education   Progress Improving as expected   OT Therapy Minutes   OT Time In 0930   OT Time Out 1100   OT Total Time (minutes) 90   OT Mode of treatment - Individual (minutes) (60)   OT Mode of treatment - Concurrent (minutes) (30)   Therapy Time missed   Time missed?  No

## 2018-07-21 PROCEDURE — 97110 THERAPEUTIC EXERCISES: CPT

## 2018-07-21 PROCEDURE — 97530 THERAPEUTIC ACTIVITIES: CPT

## 2018-07-21 RX ADMIN — LIDOCAINE 1 PATCH: 50 PATCH CUTANEOUS at 08:34

## 2018-07-21 RX ADMIN — SPIRONOLACTONE 25 MG: 25 TABLET ORAL at 08:35

## 2018-07-21 RX ADMIN — OXYCODONE HYDROCHLORIDE 10 MG: 5 TABLET ORAL at 17:35

## 2018-07-21 RX ADMIN — VALSARTAN 160 MG: 160 TABLET, FILM COATED ORAL at 08:35

## 2018-07-21 RX ADMIN — ENOXAPARIN SODIUM 40 MG: 40 INJECTION, SOLUTION INTRAVENOUS; SUBCUTANEOUS at 08:33

## 2018-07-21 RX ADMIN — OXYCODONE HYDROCHLORIDE 10 MG: 5 TABLET ORAL at 06:12

## 2018-07-21 RX ADMIN — POLYETHYLENE GLYCOL 3350 17 G: 17 POWDER, FOR SOLUTION ORAL at 08:33

## 2018-07-21 RX ADMIN — METHOCARBAMOL 500 MG: 500 TABLET ORAL at 07:36

## 2018-07-21 RX ADMIN — LIDOCAINE 1 PATCH: 50 PATCH TOPICAL at 08:34

## 2018-07-21 RX ADMIN — FAMOTIDINE 20 MG: 20 TABLET ORAL at 08:36

## 2018-07-21 RX ADMIN — HYDROCHLOROTHIAZIDE 25 MG: 25 TABLET ORAL at 08:35

## 2018-07-21 RX ADMIN — ZOLPIDEM TARTRATE 10 MG: 10 TABLET, FILM COATED ORAL at 22:16

## 2018-07-21 RX ADMIN — OXYCODONE HYDROCHLORIDE 10 MG: 5 TABLET ORAL at 12:07

## 2018-07-21 RX ADMIN — CELECOXIB 200 MG: 200 CAPSULE ORAL at 17:30

## 2018-07-21 RX ADMIN — ASPIRIN 81 MG 81 MG: 81 TABLET ORAL at 07:36

## 2018-07-21 RX ADMIN — CELECOXIB 200 MG: 200 CAPSULE ORAL at 08:36

## 2018-07-21 RX ADMIN — PREGABALIN 100 MG: 100 CAPSULE ORAL at 08:35

## 2018-07-21 RX ADMIN — PREGABALIN 100 MG: 100 CAPSULE ORAL at 17:30

## 2018-07-21 RX ADMIN — DOCUSATE SODIUM 100 MG: 100 CAPSULE, LIQUID FILLED ORAL at 17:32

## 2018-07-21 RX ADMIN — ASPIRIN 81 MG 81 MG: 81 TABLET ORAL at 17:30

## 2018-07-21 RX ADMIN — DOCUSATE SODIUM 100 MG: 100 CAPSULE, LIQUID FILLED ORAL at 08:35

## 2018-07-21 RX ADMIN — MULTIPLE VITAMINS W/ MINERALS TAB 1 TABLET: TAB at 08:35

## 2018-07-21 NOTE — NURSING NOTE
Becomes very anxious at times, concerned about not having pain medication when discharged from [facility]  Medicated for pain L knee x2 this shift with good results  Educated patient on pain management regimen , verbalized understanding

## 2018-07-21 NOTE — PROGRESS NOTES
OT Daily Treatment       07/21/18 0820   Pain Assessment   Pain Assessment 0-10   Pain Score 8   Pain Type Surgical pain   Pain Location Knee   Pain Orientation Left   Restrictions/Precautions   LLE Weight Bearing Per Order WBAT   Bed Mobility   Rolling R 5  Supervision   Supine to Sit 5  Supervision   Additional items Assist x 1;Bedrails   Transfers   Sit to Stand 5  Supervision   Additional items Assist x 1   Stand to Sit 5  Supervision   Additional items Armrests   Stand pivot 4  Minimal assistance   Therapeutic Exercise - ROM   UE-ROM Yes   Cognition   Overall Cognitive Status [facility]   Arousal/Participation Alert; Responsive;Arousable; Cooperative; Other (Comment)  (limited by feeling cold, "not right")   Attention Within functional limits   Orientation Level Oriented X4   Other Comments   Assessment Tolerated transfers, therapeutic ex well  Limited by fatigue and pain, and generalized feelings of not feeling well  Nursing aware of same, present during session to administer meds  Assessment   Treatment Assessment Tolerating well - limited by surgical pain  Plan   Treatment/Interventions ADL retraining;Functional transfer training   OT Therapy Minutes   OT Time In 0815   OT Time Out 0915   OT Total Time (minutes) 60   OT Mode of treatment - Individual (minutes) 60       Patient with decreased overall tolerance to session today due to subjective complaints of generally not feeling well  Funct mob from bed to OT gym with RW and supervision  Tolerated standing x 2 mins for arm bike before needing rest break  Completed arm bike sitting  Transfers to/from recliner with CG A and RW use  Green digiflex for bilat gross grasp  Left in recliner at end of session due to feeling more comfortable vs  Bed  Discussed same with nursing staff

## 2018-07-21 NOTE — PLAN OF CARE
PAIN - ADULT     Verbalizes/displays adequate comfort level or baseline comfort level Not Progressing    DISCHARGE PLANNING     Discharge to home or other facility with appropriate resources Progressing        HEMATOLOGIC - ADULT     Maintains hematologic stability Progressing        INFECTION - ADULT     Absence or prevention of progression during hospitalization Progressing     Absence of fever/infection during neutropenic period Progressing        MUSCULOSKELETAL - ADULT     Maintain or return mobility to safest level of function Progressing     Maintain proper alignment of affected body part Progressing        Nutrition/Hydration-ADULT     Nutrient/Hydration intake appropriate for improving, restoring or maintaining nutritional needs Progressing        Potential for Falls     Patient will remain free of falls Progressing        Prexisting or High Potential for Compromised Skin Integrity     Skin integrity is maintained or improved Progressing        SAFETY ADULT     Patient will remain free of falls Progressing     Maintain or return to baseline ADL function Progressing     Maintain or return mobility status to optimal level Progressing        SKIN/TISSUE INTEGRITY - ADULT     Skin integrity remains intact Progressing     Incision(s), wounds(s) or drain site(s) healing without S/S of infection Progressing     Oral mucous membranes remain intact Progressing

## 2018-07-22 RX ADMIN — METHOCARBAMOL 500 MG: 500 TABLET ORAL at 12:33

## 2018-07-22 RX ADMIN — ASPIRIN 81 MG 81 MG: 81 TABLET ORAL at 07:40

## 2018-07-22 RX ADMIN — HYDROCHLOROTHIAZIDE 25 MG: 25 TABLET ORAL at 08:51

## 2018-07-22 RX ADMIN — ENOXAPARIN SODIUM 40 MG: 40 INJECTION, SOLUTION INTRAVENOUS; SUBCUTANEOUS at 08:52

## 2018-07-22 RX ADMIN — OXYCODONE HYDROCHLORIDE 10 MG: 5 TABLET ORAL at 05:57

## 2018-07-22 RX ADMIN — SPIRONOLACTONE 25 MG: 25 TABLET ORAL at 08:50

## 2018-07-22 RX ADMIN — DOCUSATE SODIUM 100 MG: 100 CAPSULE, LIQUID FILLED ORAL at 08:51

## 2018-07-22 RX ADMIN — FAMOTIDINE 20 MG: 20 TABLET ORAL at 08:50

## 2018-07-22 RX ADMIN — CELECOXIB 200 MG: 200 CAPSULE ORAL at 17:07

## 2018-07-22 RX ADMIN — DOCUSATE SODIUM 100 MG: 100 CAPSULE, LIQUID FILLED ORAL at 17:08

## 2018-07-22 RX ADMIN — OXYCODONE HYDROCHLORIDE 10 MG: 5 TABLET ORAL at 18:44

## 2018-07-22 RX ADMIN — VALSARTAN 160 MG: 160 TABLET, FILM COATED ORAL at 08:52

## 2018-07-22 RX ADMIN — LIDOCAINE 1 PATCH: 50 PATCH CUTANEOUS at 08:54

## 2018-07-22 RX ADMIN — MULTIPLE VITAMINS W/ MINERALS TAB 1 TABLET: TAB at 08:50

## 2018-07-22 RX ADMIN — LIDOCAINE 1 PATCH: 50 PATCH TOPICAL at 08:53

## 2018-07-22 RX ADMIN — CELECOXIB 200 MG: 200 CAPSULE ORAL at 08:49

## 2018-07-22 RX ADMIN — OXYCODONE HYDROCHLORIDE 10 MG: 5 TABLET ORAL at 12:33

## 2018-07-22 RX ADMIN — POLYETHYLENE GLYCOL 3350 17 G: 17 POWDER, FOR SOLUTION ORAL at 08:51

## 2018-07-22 RX ADMIN — ASPIRIN 81 MG 81 MG: 81 TABLET ORAL at 16:18

## 2018-07-22 RX ADMIN — PREGABALIN 100 MG: 100 CAPSULE ORAL at 08:49

## 2018-07-22 RX ADMIN — PREGABALIN 100 MG: 100 CAPSULE ORAL at 17:08

## 2018-07-22 RX ADMIN — METHOCARBAMOL 500 MG: 500 TABLET ORAL at 05:57

## 2018-07-22 NOTE — ASSESSMENT & PLAN NOTE
OA L knee s/p L TKA  Doing better  Overall  Slept well  PT/OT  Celebrex,  OxyIR  DC Tramadol- pte declines  Add Lidoderm patch     Lovenox DVT prophy  Ambien for insomnia

## 2018-07-22 NOTE — NURSING NOTE
Needs much encourage to move, remained in bed entire shift  Medicated for pain L knee with good results  See assessment  Ice to L knee  Edema L knee, incision intact clean dry   GERARDO

## 2018-07-22 NOTE — PROGRESS NOTES
Progress Note Vania Grover 1965, 52 y o  female MRN: [id number]    Unit/Bed#: [facility] 216-02 Encounter: [id number]    Primary Care Provider: Karina Clark DO   Date and time admitted to hospital: 7/14/2018  1:04 PM        * Primary osteoarthritis of left knee   Assessment & Plan    OA L knee s/p L TKA  Doing better  Overall  Slept well  PT/OT  Celebrex,  OxyIR  DC Tramadol- pte declines  Add Lidoderm patch  Lovenox DVT prophy  Ambien for insomnia        Essential hypertension   Assessment & Plan    HTN    Antihypertensives        Leukocytosis   Assessment & Plan    Leucocytosis resolved  Pte c/o malaise, Low grade Temp resolved      CBC/UA/BMP normal             Vitals:  Temp:  [97 7 °F (36 5 °C)-98 °F (36 7 °C)] 97 7 °F (36 5 °C)  HR:  [] 98  Resp:  [16-18] 16  BP: (140)/(80-88) 140/80    Physical Exam:  General: alert, no apparent distress, cooperative and comfortable  Pain well controlled  Slept well  No complaints  HENMT: Head: Normal, normocephalic, atraumatic  Eye: Normal external eye, conjunctiva, lids   Ears: Normal external ears  Nose: Normal external nose, mucus membranes  COR: [id number]  Pulmonary: chest expansion normal, no retractions, no accessory muscle usage, no wheezes, rales, or rhonchi   Abdomen: soft, nontender, nondistended, no masses or organomegaly  Skin/Extremity: L knee incision c/d/glued/intact  Mod edema, calves soft, NT  Neurologic: Awake alert orientedx3  Psych: normal mood, behavior, speech, dress, and thought processes  Musculoskeletal: AROM wfl all extremities  Patient is progressing with ADLs and functional mobility, cognition & speech        Current Facility-Administered Medications   Medication Dose Route Frequency Provider Last Rate Last Dose    acetaminophen (TYLENOL) tablet 650 mg  650 mg Oral Q6H PRN ANGELIKA Tapia   650 mg at 07/19/18 0155    aluminum-magnesium hydroxide-simethicone (MYLANTA) 200-200-20 mg/5 mL oral suspension 30 mL  30 mL Oral Q4H PRN Foreign Delgado MD        aspirin chewable tablet 81 mg  81 mg Oral BID With Meals Foreign Delgado MD   81 mg at 07/22/18 0740    bisacodyl (DULCOLAX) rectal suppository 10 mg  10 mg Rectal Daily PRN Foreign Delgado MD        celecoxib (CeleBREX) capsule 200 mg  200 mg Oral BID Foreign Delgado MD   200 mg at 07/22/18 0849    docusate sodium (COLACE) capsule 100 mg  100 mg Oral BID Foreign Delgado MD   100 mg at 07/22/18 0851    docusate sodium (COLACE) capsule 100 mg  100 mg Oral BID PRN ANGELIKA [place]   100 mg at 07/18/18 0545    enoxaparin (LOVENOX) subcutaneous injection 40 mg  40 mg Subcutaneous Q24H [address] Foreign Delgado MD   40 mg at 07/22/18 0852    famotidine (PEPCID) tablet 20 mg  20 mg Oral Daily Foerign Delgado MD   20 mg at 07/22/18 0850    valsartan (DIOVAN) tablet 160 mg  160 mg Oral Daily Foreign Delgado MD   160 mg at 07/22/18 7056    And    hydrochlorothiazide (HYDRODIURIL) tablet 25 mg  25 mg Oral Daily Foreign Delgado MD   25 mg at 07/22/18 0851    lidocaine (LIDODERM) 5 % patch 1 patch  1 patch Transdermal Daily Foreign Delgado MD   1 patch at 07/22/18 0854    lidocaine (LIDODERM) 5 % patch 1 patch  1 patch Transdermal Daily Foreign Delgado MD   1 patch at 07/22/18 0853    magnesium hydroxide (MILK OF MAGNESIA) 400 mg/5 mL oral suspension 30 mL  30 mL Oral Daily PRN ComfortANGELIKA Dotson        methocarbamol (ROBAXIN) tablet 500 mg  500 mg Oral Q6H PRN Foreign Delgado MD   500 mg at 07/22/18 0557    multivitamin-minerals (CENTRUM) tablet 1 tablet  1 tablet Oral Daily Foreign Delgado MD   1 tablet at 07/22/18 0850    ondansetron (ZOFRAN-ODT) dispersible tablet 4 mg  4 mg Oral Q6H PRN Foreign Delgado MD   4 mg at 07/18/18 0844    oxyCODONE (ROXICODONE) IR tablet 10 mg  10 mg Oral Q4H PRN Ryanne Severino MD   10 mg at 07/22/18 0557    polyethylene glycol (MIRALAX) packet 17 g  17 g Oral Daily Foreign Delgado MD   17 g at 07/22/18 0851    pregabalin (LYRICA) capsule 100 mg  100 mg Oral BID Marvin Parisi MD   100 mg at 07/22/18 0849    senna (SENOKOT) tablet 17 2 mg  2 tablet Oral HS PRN ANGELIKA Malhotra   17 2 mg at 07/17/18 2141    spironolactone (ALDACTONE) tablet 25 mg  25 mg Oral Daily Marvin Parisi MD   25 mg at 07/22/18 0850    zolpidem (AMBIEN) tablet 10 mg  10 mg Oral HS PRN Marvin Parisi MD   10 mg at 07/21/18 2216        Laboratory:    Lab Results   Component Value Date    HGB 11 4 (L) 07/16/2018    HCT 33 6 (L) 07/16/2018    WBC 9 30 07/16/2018     Lab Results   Component Value Date    BUN 19 07/16/2018     07/16/2018    K 3 8 07/16/2018    CL 98 07/16/2018    GLUCOSE 123 (H) 07/16/2018    CREATININE 0 89 07/16/2018     Lab Results   Component Value Date    PROTIME 12 0 06/28/2018    INR 0 93 06/28/2018

## 2018-07-22 NOTE — PROGRESS NOTES
Supervision in room with RW to br to toilet  Medicated with 2 percocet in afternoon for c/o pain to left knee incision site, ice packs placed to site for pain

## 2018-07-22 NOTE — PLAN OF CARE
Problem: INFECTION - ADULT  Goal: Absence of fever/infection during neutropenic period  INTERVENTIONS:  - Monitor WBC  Outcome: Progressing

## 2018-07-22 NOTE — PLAN OF CARE
Problem: SKIN/TISSUE INTEGRITY - ADULT  Goal: Skin integrity remains intact  INTERVENTIONS  - Identify patients at risk for skin breakdown  - Assess and monitor skin integrity  - Assess and monitor nutrition and hydration status  - Monitor labs (i e  albumin)  - Assess for incontinence   - Turn and reposition patient  - Assist with mobility/ambulation  - Relieve pressure over bony prominences  - Avoid friction and shearing  - Provide appropriate hygiene as needed including keeping skin clean and dry  - Evaluate need for skin moisturizer/barrier cream  - Collaborate with interdisciplinary team (i e  Nutrition, Rehabilitation, etc )   - Patient/family teaching   Outcome: Progressing    Goal: Incision(s), wounds(s) or drain site(s) healing without S/S of infection  INTERVENTIONS  - Assess and document risk factors for skin impairment   - Assess and document dressing, incision, wound bed, drain sites and surrounding tissue  - Initiate Nutrition services consult and/or wound management as needed   Outcome: Progressing      Problem: HEMATOLOGIC - ADULT  Goal: Maintains hematologic stability  INTERVENTIONS  - Assess for signs and symptoms of bleeding or hemorrhage  - Monitor labs  - Administer supportive blood products/factors as ordered and appropriate   Outcome: Progressing      Problem: MUSCULOSKELETAL - ADULT  Goal: Maintain or return mobility to safest level of function  INTERVENTIONS:  - Assess patient's ability to carry out ADLs; assess patient's baseline for ADL function and identify physical deficits which impact ability to perform ADLs (bathing, care of mouth/teeth, toileting, grooming, dressing, etc )  - Assess/evaluate cause of self-care deficits   - Assess range of motion  - Assess patient's mobility; develop plan if impaired  - Assess patient's need for assistive devices and provide as appropriate  - Encourage maximum independence but intervene and supervise when necessary  - Involve family in performance of ADLs  - Assess for home care needs following discharge   - Request OT consult to assist with ADL evaluation and planning for discharge  - Provide patient education as appropriate   Outcome: Progressing    Goal: Maintain proper alignment of affected body part  INTERVENTIONS:  - Support, maintain and protect limb and body alignment  - Provide pt/fam with appropriate education   Outcome: Progressing      Problem: PAIN - ADULT  Goal: Verbalizes/displays adequate comfort level or baseline comfort level  Interventions:  - Encourage patient to monitor pain and request assistance  - Assess pain using appropriate pain scale  - Administer analgesics based on type and severity of pain and evaluate response  - Implement non-pharmacological measures as appropriate and evaluate response  - Consider cultural and social influences on pain and pain management  - Notify physician/advanced practitioner if interventions unsuccessful or patient reports new pain   Outcome: Progressing      Problem: INFECTION - ADULT  Goal: Absence or prevention of progression during hospitalization  INTERVENTIONS:  - Assess and monitor for signs and symptoms of infection  - Monitor lab/diagnostic results  - Monitor all insertion sites, i e  indwelling lines, tubes, and drains  - Monitor endotracheal (as able) and nasal secretions for changes in amount and color  - [place] appropriate cooling/warming therapies per order  - Administer medications as ordered  - Instruct and encourage patient and family to use good hand hygiene technique  - Identify and instruct in appropriate isolation precautions for identified infection/condition   Outcome: Progressing

## 2018-07-23 PROCEDURE — 97110 THERAPEUTIC EXERCISES: CPT

## 2018-07-23 PROCEDURE — 97530 THERAPEUTIC ACTIVITIES: CPT

## 2018-07-23 PROCEDURE — 97116 GAIT TRAINING THERAPY: CPT

## 2018-07-23 PROCEDURE — 97535 SELF CARE MNGMENT TRAINING: CPT

## 2018-07-23 PROCEDURE — 97537 COMMUNITY/WORK REINTEGRATION: CPT

## 2018-07-23 RX ORDER — OXYCODONE HYDROCHLORIDE 10 MG/1
10 TABLET ORAL EVERY 6 HOURS PRN
Refills: 0
Start: 2018-07-23 | End: 2018-08-02

## 2018-07-23 RX ORDER — OXYCODONE HYDROCHLORIDE 10 MG/1
10 TABLET ORAL EVERY 4 HOURS PRN
Qty: 30 TABLET | Refills: 0 | Status: SHIPPED | OUTPATIENT
Start: 2018-07-23 | End: 2018-08-02

## 2018-07-23 RX ORDER — METHOCARBAMOL 500 MG/1
500 TABLET, FILM COATED ORAL EVERY 6 HOURS PRN
Qty: 60 TABLET | Refills: 0 | Status: SHIPPED | OUTPATIENT
Start: 2018-07-23

## 2018-07-23 RX ORDER — PREGABALIN 100 MG/1
100 CAPSULE ORAL 2 TIMES DAILY
Qty: 20 CAPSULE | Refills: 0 | Status: SHIPPED | OUTPATIENT
Start: 2018-07-23 | End: 2018-08-02

## 2018-07-23 RX ORDER — CELECOXIB 200 MG/1
200 CAPSULE ORAL 2 TIMES DAILY
Qty: 60 CAPSULE | Refills: 0 | Status: SHIPPED | OUTPATIENT
Start: 2018-07-23

## 2018-07-23 RX ORDER — ZOLPIDEM TARTRATE 10 MG/1
10 TABLET ORAL
Qty: 30 TABLET | Refills: 0 | Status: SHIPPED | OUTPATIENT
Start: 2018-07-23 | End: 2018-08-02

## 2018-07-23 RX ORDER — LIDOCAINE 50 MG/G
1 PATCH TOPICAL DAILY
Qty: 30 PATCH | Refills: 0 | Status: SHIPPED | OUTPATIENT
Start: 2018-07-24

## 2018-07-23 RX ORDER — CELECOXIB 200 MG/1
200 CAPSULE ORAL 2 TIMES DAILY
Qty: 60 CAPSULE | Refills: 0
Start: 2018-07-23

## 2018-07-23 RX ORDER — PREGABALIN 100 MG/1
100 CAPSULE ORAL 2 TIMES DAILY
Qty: 20 CAPSULE | Refills: 0
Start: 2018-07-23 | End: 2018-08-02

## 2018-07-23 RX ADMIN — METHOCARBAMOL 500 MG: 500 TABLET ORAL at 13:04

## 2018-07-23 RX ADMIN — HYDROCHLOROTHIAZIDE 25 MG: 25 TABLET ORAL at 10:12

## 2018-07-23 RX ADMIN — POLYETHYLENE GLYCOL 3350 17 G: 17 POWDER, FOR SOLUTION ORAL at 10:22

## 2018-07-23 RX ADMIN — METHOCARBAMOL 500 MG: 500 TABLET ORAL at 02:22

## 2018-07-23 RX ADMIN — FAMOTIDINE 20 MG: 20 TABLET ORAL at 10:11

## 2018-07-23 RX ADMIN — CELECOXIB 200 MG: 200 CAPSULE ORAL at 10:12

## 2018-07-23 RX ADMIN — OXYCODONE HYDROCHLORIDE 10 MG: 5 TABLET ORAL at 07:38

## 2018-07-23 RX ADMIN — DOCUSATE SODIUM 100 MG: 100 CAPSULE, LIQUID FILLED ORAL at 10:20

## 2018-07-23 RX ADMIN — ASPIRIN 81 MG 81 MG: 81 TABLET ORAL at 17:04

## 2018-07-23 RX ADMIN — VALSARTAN 160 MG: 160 TABLET, FILM COATED ORAL at 10:11

## 2018-07-23 RX ADMIN — LIDOCAINE 1 PATCH: 50 PATCH CUTANEOUS at 10:20

## 2018-07-23 RX ADMIN — OXYCODONE HYDROCHLORIDE 10 MG: 5 TABLET ORAL at 18:15

## 2018-07-23 RX ADMIN — DOCUSATE SODIUM 100 MG: 100 CAPSULE, LIQUID FILLED ORAL at 17:05

## 2018-07-23 RX ADMIN — ZOLPIDEM TARTRATE 10 MG: 10 TABLET, FILM COATED ORAL at 22:14

## 2018-07-23 RX ADMIN — LIDOCAINE 1 PATCH: 50 PATCH TOPICAL at 10:19

## 2018-07-23 RX ADMIN — PREGABALIN 100 MG: 100 CAPSULE ORAL at 10:22

## 2018-07-23 RX ADMIN — MULTIPLE VITAMINS W/ MINERALS TAB 1 TABLET: TAB at 10:22

## 2018-07-23 RX ADMIN — CELECOXIB 200 MG: 200 CAPSULE ORAL at 17:04

## 2018-07-23 RX ADMIN — OXYCODONE HYDROCHLORIDE 10 MG: 5 TABLET ORAL at 13:03

## 2018-07-23 RX ADMIN — SPIRONOLACTONE 25 MG: 25 TABLET ORAL at 10:12

## 2018-07-23 RX ADMIN — ENOXAPARIN SODIUM 40 MG: 40 INJECTION, SOLUTION INTRAVENOUS; SUBCUTANEOUS at 10:12

## 2018-07-23 RX ADMIN — OXYCODONE HYDROCHLORIDE 10 MG: 5 TABLET ORAL at 02:22

## 2018-07-23 RX ADMIN — PREGABALIN 100 MG: 100 CAPSULE ORAL at 17:05

## 2018-07-23 RX ADMIN — ASPIRIN 81 MG 81 MG: 81 TABLET ORAL at 07:38

## 2018-07-23 NOTE — PROGRESS NOTES
07/23/18 1330   Pain Assessment   Pain Assessment 0-10   Pain Score 8   Pain Type Surgical pain   Pain Location Knee   Pain Orientation Left   Cognition   Overall Cognitive Status WFL   Orientation Level Oriented X4   QI: Roll Left and Right   Assistance Needed Independent   Roll Left and Right CARE Score 6   QI: Sit to Lying   Assistance Needed Independent   Sit to Lying CARE Score 6   QI: Lying to Sitting on Side of Bed   Assistance Needed Independent   Lying to Sitting on Side of Bed CARE Score 6   QI: Sit to Stand   Assistance Needed Independent   Sit to Stand CARE Score 6   Bed Mobility   Able to Roll Left to Right;Right to Left   Adaptive Equipment Side Rails   Findings mod I   QI: Chair/Bed-to-Chair Transfer   Assistance Needed Independent   Chair/Bed-to-Chair Transfer CARE Score 6   Transfer Bed/Chair/Wheelchair   Limitations Noted In Balance;Pain Management;LE Strength   Adaptive Equipment Roller Walker   Stand Pivot Modified Independent   Sit to Stand Modified Independent   Stand to Sit Modified Independent   Supine to Sit Modified Independent   Sit to Supine Modified Independent   Bed, Chair, Wheelchair Transfer (FIM) 6 - Patient requires assistive device/extra time/safety concerns but completes independently   QI: Walk 10 Feet   Assistance Needed Independent   Walk 10 Feet CARE Score 6   QI: Walk 50 Feet with Two Turns   Assistance Needed Independent   Walk 50 Feet with Two Turns CARE Score 6   QI: Walk 150 Feet   Assistance Needed Independent   Walk 150 Feet CARE Score 6   QI: Walking 10 Feet on Uneven Surfaces   Assistance Needed Independent   Walking 10 Feet on Uneven Surfaces CARE Score 6   Ambulation   Does the patient walk? 2   Yes   Primary Discharge Mode of Locomotion Walk   Walk Assist Level Modified Independent   Assist Device Roller Walker   Distance Walked (feet) (150' 100')   Limitations Noted In Balance   Findings (level and carpet)   Walking (FIM) 6 - Patient requires assistive device/extra time/safety concerns but completes independently AND distance 150 feet or more, no rest   QI: Wheel 50 Feet with Two Turns   Reason if not Attempted Activity not applicable   Wheel 50 Feet with Two Turns CARE Score 9   QI: Wheel 150 Feet   Reason if not Attempted Activity not applicable   Wheel 730 Feet CARE Score 9   Wheelchair mobility   QI: Does the patient use a wheelchair? 0  No   Wheelchair (FIM) 0 - Activity does not occur   QI: 1 Step (Curb)   Assistance Needed Supervision   Comment 2 curb steps with RW   1 Step (Curb) CARE Score 4   QI: 4 Steps   Assistance Needed Supervision   4 Steps CARE Score 4   QI: 12 Steps   Comment Patient very anxious and nervous about steps at home despite completing 10 with 1 handrail and supervision  Recommend 1st floor set up at home with [facility] and only doing steps to 2nd floor when  is there to assist    Reason if not Attempted Safety concerns   12 Steps CARE Score 88   Stairs   Type Stairs; Ramp   # of Steps 10   Assist Devices Single Rail   Findings (mod i ramp with RW; supervision  steps with 1 handrail)   Stairs (FIM) 2 - Patient goes up and down 4 - 11 stairs regardless of assist/device/setup   Therapeutic Interventions   Strengthening seated and supine ther ex 3 x 10   Balance gait and transfer training with RW   Assessment   Treatment Assessment Patient mod I with all functional mobility using RW except for curb steps with RW and steps with 1 handrail   PT Barriers   Physical Impairment Decreased strength;Decreased range of motion;Decreased safety awareness;Pain   Functional Limitation Stair negotiation   Plan   Treatment/Interventions Functional transfer training;LE strengthening/ROM; Elevations; Therapeutic exercise; Bed mobility;Gait training   Progress Improving as expected   Recommendation   PT - OK to Discharge Yes   PT Therapy Minutes   PT Time In 1330   PT Time Out 1500   PT Total Time (minutes) 90   PT Mode of treatment - Individual (minutes) 45 PT Mode of treatment - Concurrent (minutes) 45   PT Mode of treatment - Group (minutes) 0   PT Mode of treatment - Co-treat (minutes) 0   PT Mode of Teatment - Total time(minutes) 90 minutes   Therapy Time missed   Time missed?  No

## 2018-07-23 NOTE — CASE MANAGEMENT
Reviewed DC planning; Pt to be [address] [address] home tomorrow  Referral made to [facility] for outpatient PT per Pt preference

## 2018-07-23 NOTE — PROGRESS NOTES
Progress Note Sheyla Cedeno 1965, 52 y o  female MRN: [id number]    Unit/Bed#: [facility] 216-02 Encounter: [id number]    Primary Care Provider: Johan Kirkpatrick DO   Date and time admitted to hospital: 7/14/2018  1:04 PM        * Primary osteoarthritis of left knee   Assessment & Plan    OA L knee s/p L TKA  Doing better  Overall  Slept well  PT/OT  Celebrex,  OxyIR  DC Tramadol- pte declines  Add Lidoderm patch  Lovenox DVT prophy  Ambien for insomnia        Essential hypertension   Assessment & Plan    HTN    Antihypertensives        Leukocytosis   Assessment & Plan    Leucocytosis resolved  Pte c/o malaise, Low grade Temp resolved      CBC/UA/BMP normal             Vitals:  Temp:  [98 2 °F (36 8 °C)-98 8 °F (37 1 °C)] 98 2 °F (36 8 °C)  HR:  [] 99  Resp:  [16-20] 20  BP: (100-136)/(60-70) 136/70    Physical Exam:  General: alert, no apparent distress, cooperative and comfortable  Pain controlled  HENMT: Head: Normal, normocephalic, atraumatic  Eye: Normal external eye, conjunctiva, lids   Ears: Normal external ears  Nose: Normal external nose, mucus membranes  COR: [id number]  Pulmonary: chest expansion normal, no retractions, no accessory muscle usage, no wheezes, rales, or rhonchi   Abdomen: soft, nontender, nondistended, no masses or organomegaly  Skin/Extremity: L knee incision glued, C/D/Intact  Mod edema, calves soft, NT  Neurologic: Awake alert orientedx3  Psych: normal mood, behavior, speech, dress, and thought processes  Musculoskeletal: limited rom L knee  Patient is progressing with ADLs and functional mobility, cognition & speech        Current Facility-Administered Medications   Medication Dose Route Frequency Provider Last Rate Last Dose    acetaminophen (TYLENOL) tablet 650 mg  650 mg Oral Q6H PRN ANGELIKA Dickey   650 mg at 07/19/18 0155    aluminum-magnesium hydroxide-simethicone (MYLANTA) 200-200-20 mg/5 mL oral suspension 30 mL  30 mL Oral Q4H PRN Jessica Hogan MD  aspirin chewable tablet 81 mg  81 mg Oral BID With Meals Carlie Carlos MD   81 mg at 07/23/18 0738    bisacodyl (DULCOLAX) rectal suppository 10 mg  10 mg Rectal Daily PRN Carlie Carlos MD        celecoxib (CeleBREX) capsule 200 mg  200 mg Oral BID Carlie Carlos MD   200 mg at 07/22/18 1707    docusate sodium (COLACE) capsule 100 mg  100 mg Oral BID Carlie Carlos MD   100 mg at 07/22/18 1708    docusate sodium (COLACE) capsule 100 mg  100 mg Oral BID PRN ANGELIKA Moe   100 mg at 07/18/18 0545    enoxaparin (LOVENOX) subcutaneous injection 40 mg  40 mg Subcutaneous Q24H [facility] Carlie Carlos MD   40 mg at 07/22/18 0852    famotidine (PEPCID) tablet 20 mg  20 mg Oral Daily Carlie Carlos MD   20 mg at 07/22/18 0850    valsartan (DIOVAN) tablet 160 mg  160 mg Oral Daily Carlie Carlos MD   160 mg at 07/22/18 1661    And    hydrochlorothiazide (HYDRODIURIL) tablet 25 mg  25 mg Oral Daily Carlie Carlos MD   25 mg at 07/22/18 0851    lidocaine (LIDODERM) 5 % patch 1 patch  1 patch Transdermal Daily Carlie Carlos MD   1 patch at 07/22/18 0854    lidocaine (LIDODERM) 5 % patch 1 patch  1 patch Transdermal Daily Carlie Carlos MD   1 patch at 07/22/18 0853    magnesium hydroxide (MILK OF MAGNESIA) 400 mg/5 mL oral suspension 30 mL  30 mL Oral Daily PRN ANGELIKA Dickey        methocarbamol (ROBAXIN) tablet 500 mg  500 mg Oral Q6H PRN Carlie Carlos MD   500 mg at 07/23/18 0222    multivitamin-minerals (CENTRUM) tablet 1 tablet  1 tablet Oral Daily Carlie Carlos MD   1 tablet at 07/22/18 0850    ondansetron (ZOFRAN-ODT) dispersible tablet 4 mg  4 mg Oral Q6H PRN Carlie Carlos MD   4 mg at 07/18/18 0844    oxyCODONE (ROXICODONE) IR tablet 10 mg  10 mg Oral Q4H PRN Evan Loco MD   10 mg at 07/23/18 0738    polyethylene glycol (MIRALAX) packet 17 g  17 g Oral Daily Carlie Carlos MD   17 g at 07/22/18 0851    pregabalin (Lennice Landau) capsule 100 mg  100 mg Oral BID Marilee Ward MD   100 mg at 07/22/18 1708    senna (SENOKOT) tablet 17 2 mg  2 tablet Oral HS PRN ANGELIKA Barreto   17 2 mg at 07/17/18 2141    spironolactone (ALDACTONE) tablet 25 mg  25 mg Oral Daily Marilee Ward MD   25 mg at 07/22/18 0850    zolpidem (AMBIEN) tablet 10 mg  10 mg Oral HS PRN Marilee Ward MD   10 mg at 07/21/18 2216        Laboratory:    Lab Results   Component Value Date    HGB 11 4 (L) 07/16/2018    HCT 33 6 (L) 07/16/2018    WBC 9 30 07/16/2018     Lab Results   Component Value Date    BUN 19 07/16/2018     07/16/2018    K 3 8 07/16/2018    CL 98 07/16/2018    GLUCOSE 123 (H) 07/16/2018    CREATININE 0 89 07/16/2018     Lab Results   Component Value Date    PROTIME 12 0 06/28/2018    INR 0 93 06/28/2018

## 2018-07-23 NOTE — PROGRESS NOTES
07/23/18 1000   Pain Assessment   Pain Assessment 0-10   Pain Score 8   Pain Type Surgical pain   Pain Location Knee   QI: Oral Hygiene   Assistance Needed Independent   Oral Hygiene CARE Score 6   Grooming   Able To Initiate Tasks;Comb/Brush Hair;Wash/Dry Face;Brush/Clean Teeth;Wash/Dry Hands   Grooming (FIM) 7 - No helper, safely, timely and completes all tasks independently   QI: Shower/Bathe Self   Assistance Needed Independent   Shower/Bathe Self CARE Score 6   Bathing   Assessed Bath Style Shower   Anticipated D/C Bath Style Shower   Able to Jeremiah Silvestre Yes   Able to [employer] Temperature Yes   Able to Wash/Rinse/Dry (body part) Left Arm;Right Arm;L Upper Leg;R Upper Leg;L Lower Leg/Foot;R Lower Leg/Foot;Chest;Abdomen;Perineal Area; Buttocks   Limitations Noted in (confidence)   Bathing (FIM) 6 - Patient requires assistive device/extra time/safety concerns but completes independently   Tub/Shower Transfer   Limitations Noted In (confidence)   Adaptive Equipment Grab Bars;Seat with out Back   Shower Transfer (FIM) 6 - Patient requires assistive device/extra time/safety concerns but completes independently   QI: Upper Body Dressing   Assistance Needed Independent   Upper Body Dressing CARE Score 6   QI: [address]   Lower Body Dressing CARE Score 6   QI: Putting [place]; Adaptive equipment   Putting On/Taking Off Footwear CARE Score 6   QI: Picking Up Object   Assistance Needed Independent; Adaptive equipment   Picking Up Object CARE Score 6   Dressing/Undressing Clothing   Remove UB Clothes Pullover Shirt   Remove LB Clothes Pants; Undergarment;Socks   Don UB [address] [place] [address]; Undergarment;Socks   Limitations Noted In (confidence)   Adaptive Equipment Reacher;Sock Aide   UB Dressing (FIM) 7 - No helper, safely, timely and completes all tasks independently   LB Dressing (FIM) 6 - Patient requires assistive device/extra time/safety concerns but completes independently   Transfer Bed/Chair/Wheelchair   Limitations Noted In Pain Management   Adaptive Equipment Walker   Sit to Stand (MI)   Supine to Sit (MI)   Bed, Chair, Wheelchair Transfer (FIM) 6 - Patient requires assistive device/extra time/safety concerns but completes independently   QI: [address]  Score 6   Toileting   Able to [address] down yes, up yes  Manage Hygiene Bladder; Bowel   Toileting (FIM) 6 - Patient requires assistive device/extra time/safety concerns but completes independently   QI: Toilet Transfer   Assistance Needed Independent   Toilet Transfer CARE Score 6   Toilet Transfer   Surface Assessed Raised Toilet   Toilet Transfer (FIM) 6 - Patient requires assistive device/extra time/safety concerns but completes independently   Light Housekeeping   Light Housekeeping Level Walker   Light Housekeeping Level of Assistance Modified independent   Functional Standing Tolerance   Activity (ADL, limited due to pain)   Cognition   Overall Cognitive Status WFL   Additional Activities   Additional Activities (30 minutes of concurrent tx today)   Activity Tolerance   Activity Tolerance Patient limited by pain   Medical Staff Made Aware (yes)   Other Comments   Assessment (functional mobility with RW is MI on unit)   Assessment   Treatment Assessment Pt has attained all OT goals and discharge to home is warranted tomorrow  Pt has all necessary AE and purchased all DME prior to surgery  Pt reports she can use first floor set up although she wants to go upstairs to shower and is concerned because she only has one walker  OT discussed with her she would have to purchase another walker or someone could easily carry the current walker upstairs while she is ascending/descending stairs   Pt extremely anxious about this and crying out and at times becoming loud with moans and groans during ADL  Due to pain  Pt able to retrieve items with walker safely and transport them successfully  Pt is self limiting, she is able to manage all of self care and home tasks MI with RW  Prognosis Good   Problem List Decreased range of motion;Pain   Barriers to Discharge (pain and decreased confidence)   Plan   Treatment/Interventions ADL retraining;Functional transfer training; Therapeutic exercise; Endurance training;Patient/family training;Spoke to nursing   Progress Improving as expected   Recommendation   OT Discharge Recommendation Home with family support   OT Equipment ordered (DME in place at home)   OT Therapy Minutes   OT Time In 1000   OT Time Out 1100   OT Total Time (minutes) 60   OT Mode of treatment - Individual (minutes) 30   OT Mode of treatment - Concurrent (minutes) 30   OT Mode of treatment - Group (minutes) 0   OT Mode of treatment - Co-treat (minutes) 0   OT Mode of Teatment - Total time(minutes) 60 minutes   Therapy Time missed   Time missed?  No

## 2018-07-23 NOTE — PHYSICAL THERAPY NOTE
PT DISCHARGE SUMMARY:    Patient has met max benefit for [facility] PT  Patient mod I bed mobility, transfers, gait, carpet and ramp with RW  Patient S curb steps with RW and 10 steps with 1 handrail  Recommend patient stay on first floor unless  home to assist to second floor  For d/c to home with continued PT services 7/24/18  Patient safe to transport home via car

## 2018-07-23 NOTE — NURSING NOTE
Pt resting in bed with eyes closed  No s/s of distress or pain noted  Pt slept beginning part of shift in dining room in reclining chair and than after midnight went into room and was awake for a little  PRN pain medication admin as ordered for left knee pain  Incision remains intact to left knee with trace edema  Ice PRN to knee as well for pain relief  Min assist with transfers, cont b&b  Assessment otherwise unchanged  Continuing to monitor

## 2018-07-23 NOTE — PROGRESS NOTES
07/23/18 1230   Pain Assessment   Pain Assessment 0-10   Pain Score 7   Pain Location Knee   Pain Orientation Left   Transfer Bed/Chair/Wheelchair   Sit to Stand (MI)   Findings recliner MI   Bed, Chair, Wheelchair Transfer (FIM) 6 - Patient requires assistive device/extra time/safety concerns but completes independently   Exercise Tools   Other Exercise Tool 1 (2# dowel 3x10 reps all planes)   Other Exercise Tool 2 (7# digi both hands x 30 reps)   Cognition   Overall Cognitive Status WFL   Additional Activities   Additional Activities (functional mobility with RW MI to and from OT room)   Assessment   Treatment Assessment T E  for endurance    Plan   Treatment/Interventions Therapeutic exercise   Progress Improving as expected   OT Therapy Minutes   OT Time In 1230   OT Time Out 1300   OT Total Time (minutes) 30   OT Mode of treatment - Individual (minutes) 0   OT Mode of treatment - Concurrent (minutes) 30   OT Mode of treatment - Group (minutes) 0   OT Mode of treatment - Co-treat (minutes) 0   OT Mode of Teatment - Total time(minutes) 30 minutes   Therapy Time missed   Time missed?  No

## 2018-07-24 RX ADMIN — POLYETHYLENE GLYCOL 3350 17 G: 17 POWDER, FOR SOLUTION ORAL at 08:58

## 2018-07-24 RX ADMIN — PREGABALIN 100 MG: 100 CAPSULE ORAL at 09:04

## 2018-07-24 RX ADMIN — CELECOXIB 200 MG: 200 CAPSULE ORAL at 09:07

## 2018-07-24 RX ADMIN — DOCUSATE SODIUM 100 MG: 100 CAPSULE, LIQUID FILLED ORAL at 09:04

## 2018-07-24 RX ADMIN — MULTIPLE VITAMINS W/ MINERALS TAB 1 TABLET: TAB at 09:05

## 2018-07-24 RX ADMIN — METHOCARBAMOL 500 MG: 500 TABLET ORAL at 14:51

## 2018-07-24 RX ADMIN — DOCUSATE SODIUM 100 MG: 100 CAPSULE, LIQUID FILLED ORAL at 17:03

## 2018-07-24 RX ADMIN — VALSARTAN 160 MG: 160 TABLET, FILM COATED ORAL at 09:05

## 2018-07-24 RX ADMIN — CELECOXIB 200 MG: 200 CAPSULE ORAL at 17:04

## 2018-07-24 RX ADMIN — ASPIRIN 81 MG 81 MG: 81 TABLET ORAL at 15:51

## 2018-07-24 RX ADMIN — OXYCODONE HYDROCHLORIDE 10 MG: 5 TABLET ORAL at 10:58

## 2018-07-24 RX ADMIN — PREGABALIN 100 MG: 100 CAPSULE ORAL at 17:04

## 2018-07-24 RX ADMIN — SPIRONOLACTONE 25 MG: 25 TABLET ORAL at 09:05

## 2018-07-24 RX ADMIN — METHOCARBAMOL 500 MG: 500 TABLET ORAL at 07:31

## 2018-07-24 RX ADMIN — HYDROCHLOROTHIAZIDE 25 MG: 25 TABLET ORAL at 09:05

## 2018-07-24 RX ADMIN — ZOLPIDEM TARTRATE 10 MG: 10 TABLET, FILM COATED ORAL at 23:44

## 2018-07-24 RX ADMIN — LIDOCAINE 1 PATCH: 50 PATCH TOPICAL at 08:58

## 2018-07-24 RX ADMIN — OXYCODONE HYDROCHLORIDE 10 MG: 5 TABLET ORAL at 21:27

## 2018-07-24 RX ADMIN — OXYCODONE HYDROCHLORIDE 10 MG: 5 TABLET ORAL at 15:09

## 2018-07-24 RX ADMIN — ENOXAPARIN SODIUM 40 MG: 40 INJECTION, SOLUTION INTRAVENOUS; SUBCUTANEOUS at 08:59

## 2018-07-24 RX ADMIN — FAMOTIDINE 20 MG: 20 TABLET ORAL at 09:07

## 2018-07-24 RX ADMIN — ASPIRIN 81 MG 81 MG: 81 TABLET ORAL at 07:31

## 2018-07-24 RX ADMIN — OXYCODONE HYDROCHLORIDE 10 MG: 5 TABLET ORAL at 06:02

## 2018-07-24 RX ADMIN — LIDOCAINE 1 PATCH: 50 PATCH CUTANEOUS at 09:03

## 2018-07-24 NOTE — PROGRESS NOTES
Suggested w/c transport home as recommended by Dr Eliseo Camacho and   Pt informed today is last day of insurance and may have to pay an astronomical amt if pt stays here until tomorrow per    called and made aware, speaking with his wife at this time  Nursing supervisior made aware

## 2018-07-24 NOTE — DISCHARGE SUMMARY
Discharge Summary - Donavan Sinha 52 y o  female MRN: [id number]  Unit/Bed#: [facility] 111-56 Encounter: [id number]    Admission Date: 7/14/18  Admission Orders     Ordered        07/14/18 1350  Admit Patient to  Once               Discharge Date: 7/25/18    Rehabilitation Diagnosis: L TKA    Etiologic Diagnosis: Orthopedic single joint replacement    HPI: Ildefonso Coronado is a 52 y o  female who presented to the [facility] with a h/o chronic b/l knee pain, L worse than R, unresponsive to conservative measures, worsening and limiting ambulation  On 7/11 /18 she underwent an elective L TKA  She had some post op leucocytosis, likely reactive and effusion  She began  PT and was now admitted for inpatient rehab  She participate in rehab and is stable for MT home  Procedures Performed: No orders of the defined types were placed in this encounter  Significant Findings, Care, Treatment and Services Provided: rehab    Complications: none    Functional Status: Prior to surgery pt was Independent in ADLs and mobility  On admission to rehab she was supervision for grooming, toileting, bed/chair transfers, Min assist for bathing,LE dressing, toilet transfers  She ambulated 140ft min Assist with a RW  Rehab goals: Mod I ADLs and mobility  Patient has met max benefit for [facility] PT  Patient mod I bed mobility, transfers, gait, carpet and ramp with RW  Patient S curb steps with RW and 10 steps with 1 handrail  She is I/MI for adls  She has met her goals  Physical Exam: /62   Pulse 98   Temp 98 4 °F (36 9 °C) (Tympanic)   Resp 16   Ht 5' 2" (1 575 m)   Wt 104 kg (230 lb)   SpO2 98%   BMI 42 07 kg/m²     General: alert, no apparent distress, cooperative and comfortable  Pain controlled  HENMT: Head: Normal, normocephalic, atraumatic  Eye: Normal external eye, conjunctiva, lids   Ears: Normal external ears    Nose: Normal external nose, mucus membranes  COR: [id number]  Pulmonary: chest expansion normal, no retractions, no accessory muscle usage, no wheezes, rales, or rhonchi   Abdomen: soft, nontender, nondistended, no masses or organomegaly  Skin/Extremity: L knee incision glued, C/D/Intact  Mod edema, calves soft, NT  Neurologic: Awake alert orientedx3  Psych: normal mood, behavior, speech, dress, and thought processes  Musculoskeletal: AROM wfl                                                                                     Discharge Diagnosis: L TKA    Resolved Problems  Date Reviewed: 7/23/2018    None          Discharge Medications:   Current Discharge Medication List      START taking these medications    Details   !! lidocaine (LIDODERM) 5 % Place 1 patch on the skin daily Remove & Discard patch within 12 hours or as directed by MD  Qty: 30 patch, Refills: 0    Associated Diagnoses: Primary osteoarthritis of left knee      !! lidocaine (LIDODERM) 5 % Place 1 patch on the skin daily Remove & Discard patch within 12 hours or as directed by MD  Qty: 30 patch, Refills: 0    Associated Diagnoses: Primary osteoarthritis of left knee      methocarbamol (ROBAXIN) 500 mg tablet Take 1 tablet (500 mg total) by mouth every 6 (six) hours as needed for muscle spasms  Qty: 60 tablet, Refills: 0    Associated Diagnoses: Primary osteoarthritis of left knee      zolpidem (AMBIEN) 10 mg tablet Take 1 tablet (10 mg total) by mouth daily at bedtime as needed for sleep for up to 10 days  Qty: 30 tablet, Refills: 0    Associated Diagnoses: Primary osteoarthritis of left knee       !! - Potential duplicate medications found  Please discuss with provider  Condition at Discharge: good         Discharge instructions/Information to patient and family:   See after visit summary for information provided to patient and family  Provisions for Follow-Up Care:  See after visit summary for information related to follow-up care and any pertinent home health orders        Disposition: Home    Planned Readmission: No    Discharge Statement   I spent 45 minutes discharging the patient  This time was spent on the day of discharge  I had direct contact with the patient on the day of discharge  Additional documentation is required if more than 30 minutes were spent on discharge  Discharge Medications:  See after visit summary for reconciled discharge medications provided to patient and family

## 2018-07-24 NOTE — PROGRESS NOTES
Discharge instructions reviewed with pt  Pt's  called and stated he is in [place] and unable to pick her up until tomorrow morning by noon  Dr Martine Cardenas called and made aware  Also [facility]  called, message left on answering machine  Awaiting return call

## 2018-07-24 NOTE — CASE MANAGEMENT
DC instructions reviewed  Pt being [address] home today at 5:30PM, being transported by family via car, which tx team has determined to be a safe mode of transport  FU appts indicated on DC instructions, to be scheduled by Pt per scheduling preferences  Pt declined [facility] stating that she has concerns regarding individuals in the home with her three dogs, and requested outpatient PT at [facility] ([facility] in [facility] instead; clinical information & scripts submitted & they will call Pt to schedule  Pt tearful & expressed concerns related to pain management in her home  She will follow up with surgeon on 8/1 & was reminded to contact Dr Sybil Salazar office sooner if she feels it is necessary  Provided Pt with emotional support  Tx team confirmed that Pt is cleared for DC today & recommended first floor set up  Insurance company notified of Pt's status & DC today

## 2018-07-24 NOTE — OCCUPATIONAL THERAPY NOTE
DISCHARGE SUMMARY    Pt participates in ADLs, transfers/ mobility, homemaking and BUE therex  Pt has made great progress towards goals of I/ Mod I with all goals met  Pt issued LB dressing equipment and a RW and 3 in 1 commode recommended for home  Discharge to home with  to assist with transition planned for 7/24/18 with goals met

## 2018-07-24 NOTE — TEAM CONFERENCE
Acute RehabilitationTeam Conference Note  Date: 7/24/2018   Time: 11:44 AM       Patient Name:  Freddie Pérez       Medical Record Number: [id number]   YOB: 1965  Sex: Female          Room/Bed:  [facility] 216-02  Payor Info:  Payor: Jessica Every / Plan: CAPITAL BC PLAN 361 / Product Type: Blue Fee for Service /      Admitting Diagnosis: History of orthopedic surgery [[id number] [id number]]   Admit Date/Time:  7/14/2018  1:04 PM  Admission Comments: No comment available     Primary Diagnosis:  Primary osteoarthritis of left knee  Principal Problem: Primary osteoarthritis of left knee    Patient Active Problem List    Diagnosis Date Noted    Essential hypertension 07/12/2018    Leukocytosis 07/12/2018    Primary osteoarthritis of left knee 07/11/2018       Physical Therapy:    Weight Bearing Status: Weight Bearing as Tolerated  Transfers: Contact Guard  Bed Mobility: Supervision  Amulation Distance (ft): 25 feet  Ambulation: Contact Guard  Assistive Device for Ambulation: Roller Walker  Discharge Recommendations: Home Independently  [address] with[de-identified] Outpatient Physical Therapy    7/16/18:  Patient seen for IE today  Patient with increased complaints of pain and nausea today  Patient supervision bed mobility; cg transfers and gait with RW  Higher level gait not assessed secondary to pain and nausea  Patient would benefit from continued inpatient PT to increase function, safety, and independence with RW in prep for safe d/c to home      Occupational Therapy:  Eating: Independent  Grooming: Independent  Bathing: Modified Independent  Bathing: Modified Independent  Upper Body Dressing: Independent  Lower Body Dressing: Modified Independent  Toileting: Modified Independent  Tub/Shower Transfer: Modified Independent  Toilet Transfer: Modified Independent  Cognition: Within Defined Limits  Orientation: Person, Place, Time, Situation  Discharge Recommendations: Home with:  [address] with[de-identified] Family Support       7/16/18: Pt evaluated by OT this day  Requires assist secondary to decreased balance, safety, endurance and LLE ROM and strength with increased pain  Pt's current LOF as listed above  Pt will benefit from skilled OT services to increase independence with daily tasks  7/24/18:  Pt participates in ADLs, transfers/ mobility and BUE therex  Pt has made great progress and is currently I/ Mod I for all above tasks  Pt requires encouragement for confidence with daily tasks  Pt is ready for transuituion to home with family to assist  Recommend RW and 3 in 1 commode  Speech Therapy:           No notes on file    Nursing Notes:  Appetite: Fair  Diet Type: Regular/House                      Diet Patient/Family Education Complete: Yes    Type of Wound (LDA):  (incision)           Incision 07/11/18 Knee Left (Active)   Incision Description Clean;Dry; Intact 7/24/2018  7:34 AM   [place]-wound Assessment Clean;Dry 7/24/2018  7:34 AM   Closure RUFINA 7/16/2018  8:11 AM   Drainage Amount None 7/24/2018  7:34 AM   Treatments Ice applied 7/20/2018  7:30 AM   Dressing Open to air 7/24/2018  7:34 AM   Patient Tolerance Tolerated well 7/16/2018  8:11 AM   Dressing Status Clean;Dry; Intact 7/17/2018  9:40 PM           Type of Wound Patient/Family Education: Yes  Bladder: 6 - Modified [place]     Bladder Patient/Family Education: Yes  Bowel: 6 - Modified [place]     Bowel Patient/Family Education: Yes  Pain Location: Knee  Pain Orientation: Left  Pain Score: 9                       Hospital Pain Intervention(s): Medication (See MAR), Cold applied, Repositioned  Pain Patient/Family Education: Yes  Medication Management/Safety  New Medication:  (robaxin)  Injectable: Lovenox  Safe Administration: No  Medication Patient/Family Education Complete: Yes    Pt having difficulty with pain tolerance  Support & encouragement given  Robaxin given this a m , pain med regimen reviewed by Dr Rafaela Tejeda, new orders noted      Pt  Refusing to go home due to "too much pain"  Dr Alysha Osullivan and  spoke with pt  Pt will agree to go home today, pain meds given as ordered, continue to monitor  Case Management:     Discharge Planning  Goal Length of Stay: 7  Living Arrangements: Spouse/significant other, Children  Support Systems: Spouse/significant other, Children  Assistance Needed: none  Type of Current Residence: Private residence  [address]: No  Treatment team recommendations reviewed with Pt; she declined for this worker to contact her family to discuss DC planning  Target DC date is 7/24  Pt expressed preference for outpatient PT with [facility] upon DC  SW will monitor & assist as needed with Tx & DC planning  Is the patient actively participating in therapies? yes  List any modifications to the treatment plan: na    Barriers Interventions   Decreased ROM/strength Therapeutic exercise, therapeutic activity   pain Medication magagement                 Is the patient making expected progress toward goals? yes  List any update or changes to goals: na    Medical Goals: Patient will be medically [facility] completion of [facility] program    Weekly Team Goals:   Rehab Team Goals  ADL Team Goal: Patient will be independent with ADLs with least restrictive device upon completion of rehab program  Bowel/Bladder Team Goal: Patient will be independent with bladder/bowel management with least restrictive device upon completion of rehab program  Transfer Team Goal: Patient will be independent with transfers with least restrictive device upon completion of rehab program  Locomotion Team Goal: Patient will be independent with locomotion with least restrictive device upon completion of rehab program  Cognitive Team Goal: Patient will return to premorbid level of cognitive activity upon completion of rehab program    Mod I self care, transfers, and mobility      Discussion: Plan for DC home today with first floor set-up with spouse  Plan for outpatient PT      Anticipated Discharge Date:  July 24, 2018

## 2018-07-24 NOTE — PROGRESS NOTES
Anxious and tearful this a m  About being discharged  States she does not feel ready and has too much pain  Dr Malcolm Navarro and  spoke with pt and she now agrees to go home today  Pain meds given prn , see pain reassessment  Pt states pain is mostly medial aspect of left knee  Understands discharge instructions given  States her  will pick her up later on today or early tonight

## 2018-07-25 VITALS
DIASTOLIC BLOOD PRESSURE: 80 MMHG | TEMPERATURE: 98.4 F | HEIGHT: 62 IN | HEART RATE: 89 BPM | WEIGHT: 230 LBS | RESPIRATION RATE: 20 BRPM | BODY MASS INDEX: 42.33 KG/M2 | OXYGEN SATURATION: 96 % | SYSTOLIC BLOOD PRESSURE: 140 MMHG

## 2018-07-25 RX ADMIN — VALSARTAN 160 MG: 160 TABLET, FILM COATED ORAL at 08:46

## 2018-07-25 RX ADMIN — LIDOCAINE 1 PATCH: 50 PATCH TOPICAL at 08:45

## 2018-07-25 RX ADMIN — POLYETHYLENE GLYCOL 3350 17 G: 17 POWDER, FOR SOLUTION ORAL at 08:52

## 2018-07-25 RX ADMIN — ASPIRIN 81 MG 81 MG: 81 TABLET ORAL at 06:35

## 2018-07-25 RX ADMIN — LIDOCAINE 1 PATCH: 50 PATCH CUTANEOUS at 08:48

## 2018-07-25 RX ADMIN — OXYCODONE HYDROCHLORIDE 10 MG: 5 TABLET ORAL at 14:45

## 2018-07-25 RX ADMIN — ENOXAPARIN SODIUM 40 MG: 40 INJECTION, SOLUTION INTRAVENOUS; SUBCUTANEOUS at 08:45

## 2018-07-25 RX ADMIN — HYDROCHLOROTHIAZIDE 25 MG: 25 TABLET ORAL at 08:46

## 2018-07-25 RX ADMIN — MULTIPLE VITAMINS W/ MINERALS TAB 1 TABLET: TAB at 08:46

## 2018-07-25 RX ADMIN — METHOCARBAMOL 500 MG: 500 TABLET ORAL at 06:35

## 2018-07-25 RX ADMIN — PREGABALIN 100 MG: 100 CAPSULE ORAL at 08:46

## 2018-07-25 RX ADMIN — OXYCODONE HYDROCHLORIDE 10 MG: 5 TABLET ORAL at 06:35

## 2018-07-25 RX ADMIN — SPIRONOLACTONE 25 MG: 25 TABLET ORAL at 08:46

## 2018-07-25 RX ADMIN — DOCUSATE SODIUM 100 MG: 100 CAPSULE, LIQUID FILLED ORAL at 08:46

## 2018-07-25 RX ADMIN — OXYCODONE HYDROCHLORIDE 10 MG: 5 TABLET ORAL at 10:50

## 2018-07-25 RX ADMIN — FAMOTIDINE 20 MG: 20 TABLET ORAL at 08:46

## 2018-07-25 RX ADMIN — CELECOXIB 200 MG: 200 CAPSULE ORAL at 08:46

## 2018-07-25 NOTE — DISCHARGE INSTRUCTIONS
Total Knee Replacement   WHAT YOU SHOULD KNOW:   Total knee replacement is surgery to replace a knee joint damaged by wear, injury, or disease  It is also called a total knee arthroplasty  The knee joint is where your femur (thigh bone) and tibia (large lower leg bone or shin bone) meet  A small bone called the patella (kneecap) protects your knee joint  AFTER YOU LEAVE:   Medicines:   · Pain medicine: You may be given a prescription medicine to decrease pain  Do not wait until the pain is severe before you take this medicine  · NSAIDs:  These medicines decrease swelling, pain, and fever  NSAIDs are available without a doctor's order  Ask your primary healthcare provider which medicine is right for you  Ask how much to take and when to take it  Take as directed  NSAIDs can cause stomach bleeding and kidney problems if not taken correctly  · Antibiotics: This medicine will help fight or prevent an infection  Take your antibiotics until they are gone, even if you feel better  · Anticoagulants    are a type of blood thinner medicine that helps prevent clots  Clots can cause strokes, heart attacks, and death  These medicines may cause you to bleed or bruise more easily  ¨ Watch for bleeding from your gums or nose  Watch for blood in your urine and bowel movements  Use a soft washcloth and a soft toothbrush  If you shave, use an electric razor  Avoid activities that can cause bruising or bleeding  ¨ Tell your healthcare provider about all medicines you take because many medicines cannot be used with anticoagulants  Do not start or stop any medicines unless your healthcare provider tells you to  Tell your dentist and other healthcare providers that you take anticoagulants  Wear a bracelet or necklace that says you take this medicine  ¨ You will need regular blood tests so your healthcare provider can decide how much medicine you need  Take anticoagulants exactly as directed   Tell your healthcare provider right away if you forget to take the medicine, or if you take too much  ¨ If you take warfarin, some foods can change how your blood clots  Do not make major changes to your diet while you take warfarin  Warfarin works best when you eat about the same amount of vitamin K every day  Vitamin K is found in green leafy vegetables, broccoli, grapes, and other foods  Ask for more information about what to eat when you take warfarin  · Take your medicine as directed  Call your healthcare provider if you think your medicine is not helping or if you have side effects  Tell him if you are allergic to any medicine  Keep a list of the medicines, vitamins, and herbs you take  Include the amounts, and when and why you take them  Bring the list or the pill bottles to follow-up visits  Carry your medicine list with you in case of an emergency  Follow up with your primary healthcare provider or orthopedist as directed: You may need to return to have your wound checked and stitches, staples, or drain removed  Write down your questions so you remember to ask them during your visits  Physical therapy:  A physical therapist teaches you exercises to help improve movement and strength, and to decrease pain  Self-care:   · Care for your wound as directed:  Ask how and when to change your bandage and clean your wound  · Use ice:  Ice helps decrease swelling and pain  Ice may also help prevent tissue damage  Use an ice pack or put crushed ice in a plastic bag  Cover it with a towel, and place it on your knee for 15 to 20 minutes every hour as directed  · Wear pressure stockings: These are long, tight stockings that put pressure on your legs to promote blood flow and prevent clots  · Use a knee brace, cane, walker, or crutches as directed: These devices will help decrease your risk of falling      · Prevent dislocation of your knee implant:  Do not cross your legs so that your ankle is resting on the knee where you had surgery  Contact your primary healthcare provider or orthopedist if:   · You have a fever  · You have trouble moving or bending your knee  · You have back pain or lower leg pain when you bend your foot upwards  · You have questions or concerns about your condition or care  Seek care immediately or call 911 if:   · You feel like you are going to faint  · Blood soaks through your bandage  · Your incision comes apart  · Your incision is red, swollen, or draining pus  · You cannot walk or move your knee  · Your leg feels warm, tender, and painful  It may look swollen and red  · You suddenly feel lightheaded and short of breath  · You have chest pain when you take a deep breath or cough  You may cough up blood  © 2014 [address] is for End User's use only and may not be sold, redistributed or otherwise used for commercial purposes  All illustrations and images included in CareNotes® are the copyrighted property of [employer] D [employer] M [employer] Inc  or [address]  The above information is an  only  It is not intended as medical advice for individual conditions or treatments  Talk to your doctor, nurse or pharmacist before following any medical regimen to see if it is safe and effective for you  Wound Healing and Your Diet   WHAT YOU NEED TO KNOW:   Your body uses nutrients from healthy foods to heal wounds caused by injury, surgery, or pressure ulcers  There is no special diet that will heal your wound, but a healthy meal plan can help your wound heal faster  Nutrients that are important for healing are protein, zinc, and vitamin C  Liquids are also important for wound healing  DISCHARGE INSTRUCTIONS:   Follow a healthy meal plan:   · Eat a variety of foods from each food group every day  Eat regular meals and snacks to help you get enough calories and nutrients   If you have trouble eating 3 meals each day, eat 5 to 6 small meals throughout the day instead  Include good sources of protein, zinc, and vitamin C each day  · Drink liquids as directed  Drink plenty of liquids during and between meals, unless your healthcare provider has told you to limit liquids  Ask your healthcare provider or dietitian how much liquid to drink each day and which liquids are best for you  · Limit unhealthy foods,  such as those that are high in fat, sugar, and salt  Examples include doughnuts, cookies, fried foods, candy, and regular soda  These kinds of foods are low in nutrients that are important for healing  Good sources of protein:  Your dietitian will tell you how much protein and how many calories you need each day  The average amount of protein in foods is listed below in grams (g)  To find the exact amount of protein in a food, read the food labels on packaged items  · Dairy:      ¨ 1 cup of any type of milk (8 g)    ¨ ½ cup of evaporated canned milk (9 g)    ¨ ¼ cup of nonfat dry milk (11 g)    ¨ 1 ounce of semi-hard or solid cheese (7 g)    ¨ ¼ cup of parmesan cheese (8 g)    ¨ ½ cup of cottage cheese (14 g)    ¨ ½ cup of pudding (4 g)    ¨ 1 cup of plain or fruit yogurt (8 g)    · Meats and meat substitutes:      ¨ 3 ounces of cooked freshwater fish (21 g)     ¨ 3 ounces of cooked shellfish (19 g)    ¨ ½ cup of canned tuna (14 g)    ¨ 3 ounces of cooked chicken, turkey, or other poultry (24 g)    ¨ 3 ounces of cooked beef, pork, lamb, or other red meat (21 g)    ¨ 1 large egg (6 g)    ¨ ¼ cup of fat-free egg substitute (5 g)    ¨ ½ cup of tofu or tempeh (10 g)    ¨ 1 cup of cooked dried beans, such as sahni, kidney, or navy (15 g)    · Nuts and seeds:      ¨ 2 tablespoons of almonds, cashews, sunflower seeds, or walnuts (5 g)    ¨ 2 tablespoons of peanuts (7 g)    ¨ 2 tablespoons of peanut butter (8 g)  How to add extra protein:   · Add powdered milk to milk, cereals, scrambled eggs, soups, and casseroles      · Add cheese to sauces, soups, or vegetables  · Add eggs to tuna, salads, sauces, or casseroles  · Add nutrition supplements and breakfast drink mixes to milk or shakes  · Add nuts to foods or eat them as snacks  · Add meat (beef, chicken, or pork) to soups, casseroles, pasta dishes, or vegetables  · Add beans, peas, and other legumes to salads  · Eat cottage cheese or yogurt with fruit  Good sources of vitamin C:  Vitamin C is found in fruits and vegetables  Fruits such as oranges, strawberries, grapefruit, cantaloupe, and tangerines are good sources of vitamin C  Red and green bell peppers, broccoli, potatoes, tomatoes, and cabbage are also high in vitamin C   Good sources of zinc:  Good sources of zinc are beef, liver, and crab  Smaller amounts of zinc are found in sunflower seeds, almonds, peanut butter, eggs, and milk  Other foods that contain zinc include wheat germ, black-eyed peas, and whole-grain products  How to add extra calories to foods: Your dietitian may recommend that you add extra calories to your meals if you are not eating enough  You can increase calories by adding butter, margarine, sugar, or jam to foods  Work with your dietitian if you have other medical conditions and need to follow a special diet  What else you should know about nutrients and wound healing: Your healthcare provider or dietitian may recommend vitamin and nutrition supplements if your body is low in certain nutrients  If your dietitian recommends a liquid nutrition supplement, take it between meals  Ask your healthcare provider which supplements are right for you  © 2017 [address]  Information is for End User's use only and may not be sold, redistributed or otherwise used for commercial purposes  All illustrations and images included in CareNotes® are the copyrighted property of A D A M , Inc  or [address]  The above information is an  only   It is not intended as medical advice for individual conditions or treatments  Talk to your doctor, nurse or pharmacist before following any medical regimen to see if it is safe and effective for you

## 2018-07-25 NOTE — PROGRESS NOTES
Progress Note Vania Grover 1965, 52 y o  female MRN: [id number]    Unit/Bed#: [facility] 216-02 Encounter: [id number]    Primary Care Provider: Karina Clark DO   Date and time admitted to hospital: 7/14/2018  1:04 PM    PLEASE SEE DC SUMMARY 7/24/18  DC WAS DELAYED UNTIL TODAY D/T TRANSPORTATION DELAY  PATIENT IS STABLE FOR DC TODAY  * Primary osteoarthritis of left knee   Assessment & Plan    OA L knee s/p L TKA  Doing better  Overall  Slept well  Patient did not go home as planned yesterday as her  was unable to pick her up  PT/OT  Celebrex,  OxyIR  DC Tramadol- pte declines  Add Lidoderm patch  Lovenox DVT prophy  Ambien for insomnia        Essential hypertension   Assessment & Plan    HTN    Antihypertensives        Leukocytosis   Assessment & Plan    Leucocytosis resolved  Pte c/o malaise, Low grade Temp resolved      CBC/UA/BMP normal             Vitals:  Temp:  [96 7 °F (35 9 °C)-98 4 °F (36 9 °C)] 98 4 °F (36 9 °C)  HR:  [] 89  Resp:  [16-20] 20  BP: (120-140)/(62-90) 140/80    Physical Exam:  General: alert, no apparent distress, cooperative and comfortable  HENMT: Head: Normal, normocephalic, atraumatic  Eye: Normal external eye, conjunctiva, lids   Ears: Normal external ears  Nose: Normal external nose, mucus membranes  COR: N5V9MPH  Pulmonary: chest expansion normal, no retractions, no accessory muscle usage, no wheezes, rales, or rhonchi   Abdomen: soft, nontender, nondistended, no masses or organomegaly  Skin/Extremity: L knee incision c/d/intact/mod edema/calves soft, NT b/l  Neurologic: Awake alert orientedx3  Non focal  Psych: normal mood, behavior, speech, dress, and thought processes  Musculoskeletal: AROM wfl all extremities except L knee limited, pt hesitant to fully range  Pt has reached plateau, stable for dc home        Current Facility-Administered Medications   Medication Dose Route Frequency Provider Last Rate Last Dose    acetaminophen (TYLENOL) tablet 650 mg  650 mg Oral Q6H PRN ANGELIKA Lozada   650 mg at 07/19/18 0155    aluminum-magnesium hydroxide-simethicone (MYLANTA) 200-200-20 mg/5 mL oral suspension 30 mL  30 mL Oral Q4H PRN Zeina Crook MD        aspirin chewable tablet 81 mg  81 mg Oral BID With Meals Zeina Crook MD   81 mg at 07/25/18 0635    bisacodyl (DULCOLAX) rectal suppository 10 mg  10 mg Rectal Daily PRN Zeina Crook MD        celecoxib (CeleBREX) capsule 200 mg  200 mg Oral BID Zeina Crook MD   200 mg at 07/25/18 0846    docusate sodium (COLACE) capsule 100 mg  100 mg Oral BID Zeina Crook MD   100 mg at 07/25/18 0846    docusate sodium (COLACE) capsule 100 mg  100 mg Oral BID PRN ANGELIKA Lozada   100 mg at 07/18/18 0545    enoxaparin (LOVENOX) subcutaneous injection 40 mg  40 mg Subcutaneous Q24H [address] Zeina Crook MD   40 mg at 07/25/18 0845    famotidine (PEPCID) tablet 20 mg  20 mg Oral Daily Zeina Crook MD   20 mg at 07/25/18 0846    valsartan (DIOVAN) tablet 160 mg  160 mg Oral Daily Zeina Crook MD   160 mg at 07/25/18 0846    And    hydrochlorothiazide (HYDRODIURIL) tablet 25 mg  25 mg Oral Daily Zeina Crook MD   25 mg at 07/25/18 0846    lidocaine (LIDODERM) 5 % patch 1 patch  1 patch Transdermal Daily Zeina Crook MD   1 patch at 07/25/18 0848    lidocaine (LIDODERM) 5 % patch 1 patch  1 patch Transdermal Daily Zeina Crook MD   1 patch at 07/25/18 0845    magnesium hydroxide (MILK OF MAGNESIA) 400 mg/5 mL oral suspension 30 mL  30 mL Oral Daily PRN ANGELIKA Dickey        methocarbamol (ROBAXIN) tablet 500 mg  500 mg Oral Q6H PRN Zeina Crook MD   500 mg at 07/25/18 0635    multivitamin-minerals (CENTRUM) tablet 1 tablet  1 tablet Oral Daily Zeina Crook MD   1 tablet at 07/25/18 0846    ondansetron (ZOFRAN-ODT) dispersible tablet 4 mg  4 mg Oral Q6H PRN Zeina Crook MD   4 mg at 07/18/18 0844    oxyCODONE (ROXICODONE) IR tablet 10 mg  10 mg Oral Q4H PRN Abdirizak Bishop MD   10 mg at 07/25/18 8715    polyethylene glycol (MIRALAX) packet 17 g  17 g Oral Daily Carrie Carrizales MD   17 g at 07/24/18 0858    pregabalin (LYRICA) capsule 100 mg  100 mg Oral BID Carrie Carrizales MD   100 mg at 07/25/18 0846    senna (SENOKOT) tablet 17 2 mg  2 tablet Oral HS PRN ANGELIKA Betancourt   17 2 mg at 07/17/18 2141    spironolactone (ALDACTONE) tablet 25 mg  25 mg Oral Daily Carrie Carrizales MD   25 mg at 07/25/18 0846    zolpidem (AMBIEN) tablet 10 mg  10 mg Oral HS PRN Carrie Carrizales MD   10 mg at 07/24/18 0196        Laboratory:    Lab Results   Component Value Date    HGB 11 4 (L) 07/16/2018    HCT 33 6 (L) 07/16/2018    WBC 9 30 07/16/2018     Lab Results   Component Value Date    BUN 19 07/16/2018     07/16/2018    K 3 8 07/16/2018    CL 98 07/16/2018    GLUCOSE 123 (H) 07/16/2018    CREATININE 0 89 07/16/2018     Lab Results   Component Value Date    PROTIME 12 0 06/28/2018    INR 0 93 06/28/2018

## 2018-07-25 NOTE — NURSING NOTE
Patient given all discharge instructions and gone over with patient  All medications, follow up appointments, and instructions gone over  All belongings with patient  Patient assisted down to car by staff  Interdisciplinary team assessed patient and determined safest means of transportation is via car

## 2018-07-25 NOTE — PLAN OF CARE
DISCHARGE PLANNING     Discharge to home or other facility with appropriate resources Adequate for Discharge        HEMATOLOGIC - ADULT     Maintains hematologic stability Adequate for Discharge        INFECTION - ADULT     Absence or prevention of progression during hospitalization Adequate for Discharge     Absence of fever/infection during neutropenic period Adequate for Discharge        MUSCULOSKELETAL - ADULT     Maintain or return mobility to safest level of function Adequate for Discharge     Maintain proper alignment of affected body part Adequate for Discharge        Nutrition/Hydration-ADULT     Nutrient/Hydration intake appropriate for improving, restoring or maintaining nutritional needs Adequate for Discharge        PAIN - ADULT     Verbalizes/displays adequate comfort level or baseline comfort level Adequate for Discharge        Potential for Falls     Patient will remain free of falls Adequate for Discharge        Prexisting or High Potential for Compromised Skin Integrity     Skin integrity is maintained or improved Adequate for Discharge        SAFETY ADULT     Patient will remain free of falls Adequate for Discharge     Maintain or return to baseline ADL function Adequate for Discharge     Maintain or return mobility status to optimal level Adequate for Discharge        SKIN/TISSUE INTEGRITY - ADULT     Skin integrity remains intact Adequate for Discharge     Incision(s), wounds(s) or drain site(s) healing without S/S of infection Adequate for Discharge     Oral mucous membranes remain intact Adequate for Discharge

## 2018-07-25 NOTE — ASSESSMENT & PLAN NOTE
OA L knee s/p L TKA  Doing better  Overall  Slept well  Patient did not go home as planned yesterday as her  was unable to pick her up  PT/OT  Celebrex,  OxyIR  DC Tramadol- pte declines  Add Lidoderm patch     Lovenox DVT prophy  Ambien for insomnia

## 2018-07-25 NOTE — CASE MANAGEMENT
This worker received a phone call from nursing staff at 7 PM on 7/24, stating that Pt's  was unable to transport her home as he was "stuck in [place]" & would not be able to get to [place] by midnight  Pt refused to consider alternate transportation options & refused to take a taxi cab home  Pt's  will be picking her up this morning to transport her home  Insurance company made aware of Pt's unexpected delay in [place]  Pt made aware that she may potentially be billed for the additional day in [place]

## 2018-07-25 NOTE — NURSING NOTE
Pt slept majority of shift  Requested prn pain medication x 1 before bed for relief of L knee pain  Pt is mod I in room  Otherwise pt unchanged from shift assessment

## 2019-01-12 ENCOUNTER — OFFICE VISIT (OUTPATIENT)
Dept: URGENT CARE | Facility: CLINIC | Age: 54
End: 2019-01-12
Payer: COMMERCIAL

## 2019-01-12 VITALS
BODY MASS INDEX: 42.33 KG/M2 | HEART RATE: 75 BPM | DIASTOLIC BLOOD PRESSURE: 108 MMHG | OXYGEN SATURATION: 100 % | RESPIRATION RATE: 16 BRPM | HEIGHT: 62 IN | SYSTOLIC BLOOD PRESSURE: 160 MMHG | TEMPERATURE: 98 F | WEIGHT: 230 LBS

## 2019-01-12 DIAGNOSIS — R60.0 BILATERAL LEG EDEMA: Primary | ICD-10-CM

## 2019-01-12 PROCEDURE — 99213 OFFICE O/P EST LOW 20 MIN: CPT | Performed by: PHYSICIAN ASSISTANT

## 2019-01-12 RX ORDER — DICLOFENAC SODIUM 75 MG/1
TABLET, DELAYED RELEASE ORAL
COMMUNITY
Start: 2018-05-09

## 2019-01-12 RX ORDER — FLUOXETINE HYDROCHLORIDE 40 MG/1
CAPSULE ORAL
COMMUNITY
Start: 2019-01-11

## 2019-01-12 RX ORDER — TRAMADOL HYDROCHLORIDE 50 MG/1
TABLET ORAL
COMMUNITY
Start: 2018-06-08

## 2019-01-12 RX ORDER — CLONAZEPAM 1 MG/1
TABLET ORAL
COMMUNITY
Start: 2019-01-11

## 2019-01-12 NOTE — PATIENT INSTRUCTIONS
Recommend patient go to the emergency room for further evaluation    She is going to go to [facility]

## 2019-01-12 NOTE — PROGRESS NOTES
[address] [facility]    NAME: Candace Stoddard is a 52 y o  female  : 1965    MRN: [id number]  DATE: 2019  TIME: 8:31 AM    Assessment and Plan   Bilateral leg edema [R60 0]  1  Bilateral leg edema  Transfer to other facility       Patient Instructions     Patient Instructions   Recommend patient go to the emergency room for further evaluation  She is going to go to [facility]       Chief Complaint     Chief Complaint   Patient presents with    Leg Swelling     bilateral, x 3 days       History of Present Illness   61-year-old female here with complaint of bilateral leg swelling  Patient states that the swelling has been there for the last 3-4 days  States that her feet were as hard as rocks  Denies any shortness of breath, pain, chest pain  Notes her blood pressure is higher today than it usually is  Has never had this much swelling in her legs before  Denies any dizziness, lightheadedness  Review of Systems   Review of Systems   Constitutional: Negative for activity change, appetite change, chills, diaphoresis, fatigue, fever and unexpected weight change  HENT: Positive for facial swelling  Negative for congestion, dental problem, hearing loss, sinus pressure, sneezing, sore throat, tinnitus, trouble swallowing and voice change  Eyes: Negative for photophobia, redness and visual disturbance  Respiratory: Negative for apnea, cough, chest tightness, shortness of breath, wheezing and stridor  Cardiovascular: Positive for leg swelling  Negative for chest pain and palpitations  Gastrointestinal: Negative for abdominal distention, abdominal pain, blood in stool, constipation, diarrhea, nausea and vomiting  Endocrine: Negative for cold intolerance, heat intolerance, polydipsia, polyphagia and polyuria  Genitourinary: Negative for difficulty urinating, dysuria, flank pain, frequency, hematuria and urgency     Musculoskeletal: Negative for arthralgias, back pain, gait problem, joint swelling, myalgias, neck pain and neck stiffness  Skin: Negative for pallor, rash and wound  Neurological: Negative for dizziness, tremors, seizures, speech difficulty, weakness and headaches  Hematological: Negative for adenopathy  Does not bruise/bleed easily  Psychiatric/Behavioral: Negative for agitation, confusion, dysphoric mood and sleep disturbance  The patient is not nervous/anxious  All other systems reviewed and are negative        Current Medications     Current Outpatient Prescriptions:     aspirin 81 mg chewable tablet, Chew 1 tablet (81 mg total) 2 (two) times a day with meals, Disp: , Rfl: 0    clonazePAM (KlonoPIN) 1 mg tablet, , Disp: , Rfl:     FLUoxetine (PROzac) 40 MG capsule, , Disp: , Rfl:     multivitamin (THERAGRAN) TABS, Take 1 tablet by mouth daily, Disp: , Rfl:     spironolactone (ALDACTONE) 25 mg tablet, Take 25 mg by mouth daily Take 1/2 tab daily, Disp: , Rfl:     valsartan-hydrochlorothiazide (DIOVAN-HCT) 160-25 MG per tablet, Take 1 tablet by mouth daily, Disp: , Rfl:     celecoxib (CeleBREX) 200 mg capsule, Take 1 capsule (200 mg total) by mouth 2 (two) times a day (Patient not taking: Reported on 1/12/2019 ), Disp: 60 capsule, Rfl: 0    celecoxib (CeleBREX) 200 mg capsule, Take 1 capsule (200 mg total) by mouth 2 (two) times a day (Patient not taking: Reported on 1/12/2019 ), Disp: 60 capsule, Rfl: 0    diclofenac (VOLTAREN) 75 mg EC tablet, , Disp: , Rfl:     lidocaine (LIDODERM) 5 %, Place 1 patch on the skin daily Remove & Discard patch within 12 hours or as directed by MD (Patient not taking: Reported on 1/12/2019 ), Disp: 30 patch, Rfl: 0    lidocaine (LIDODERM) 5 %, Place 1 patch on the skin daily Remove & Discard patch within 12 hours or as directed by MD (Patient not taking: Reported on 1/12/2019 ), Disp: 30 patch, Rfl: 0    methocarbamol (ROBAXIN) 500 mg tablet, Take 1 tablet (500 mg total) by mouth every 6 (six) hours as needed for muscle spasms (Patient not taking: Reported on 2019 ), Disp: 60 tablet, Rfl: 0    pregabalin (LYRICA) 100 mg capsule, Take 1 capsule (100 mg total) by mouth 2 (two) times a day for 10 days, Disp: 20 capsule, Rfl: 0    pregabalin (LYRICA) 100 mg capsule, Take 1 capsule (100 mg total) by mouth 2 (two) times a day for 10 days, Disp: 20 capsule, Rfl: 0    traMADol (ULTRAM) 50 mg tablet, , Disp: , Rfl:     zolpidem (AMBIEN) 10 mg tablet, Take 1 tablet (10 mg total) by mouth daily at bedtime as needed for sleep for up to 10 days, Disp: 30 tablet, Rfl: 0    Current Allergies     Allergies as of 2019    (No Known Allergies)          The following portions of the patient's history were reviewed and updated as appropriate: allergies, current medications, past family history, past medical history, past social history, past surgical history and problem list    Past Medical History:   Diagnosis Date    Arthritis     Hypertension      Past Surgical History:   Procedure Laterality Date    ABDOMINAL SURGERY       SECTION      FOOT SURGERY Right     bunionectomy    MN TOTAL KNEE ARTHROPLASTY Left 2018    Procedure: ARTHROPLASTY KNEE TOTAL;  Surgeon: Gianna Holland MD;  Location: [facility];  Service: Orthopedics    UTERINE SUSPENSION       Family History   Problem Relation Age of Onset    Stroke Mother     Arthritis Mother     Hypertension Father     Diabetes Father     Arthritis Father      Social History     Social History    Marital status: /Civil Union     Spouse name: N/A    Number of children: N/A    Years of education: N/A     Occupational History    Not on file       Social History Main Topics    Smoking status: Never Smoker    Smokeless tobacco: Never Used      Comment: Pt is a nonsmoker    Alcohol use No    Drug use: No    Sexual activity: No     Other Topics Concern    Not on file     Social History Narrative    No narrative on file     Medications have been verified  Objective   BP (!) 160/108   Pulse 75   Temp 98 °F (36 7 °C)   Resp 16   Ht 5' 2" (1 575 m)   Wt 104 kg (230 lb)   SpO2 100%   BMI 42 07 kg/m²      Physical Exam   Physical Exam   Constitutional: She appears well-developed and well-nourished  No distress  HENT:   Head: Normocephalic  Right Ear: External ear normal    Left Ear: External ear normal    Nose: Nose normal    Mouth/Throat: Oropharynx is clear and moist  No oropharyngeal exudate  Neck: Normal range of motion  Neck supple  Cardiovascular: Normal rate, regular rhythm and normal heart sounds  No murmur heard  Pulmonary/Chest: Effort normal and breath sounds normal  No respiratory distress  She has no wheezes  She has no rales  Abdominal: Soft  Bowel sounds are normal  There is no tenderness  Musculoskeletal: Normal range of motion  She exhibits edema (Plus two leg edema bilaterally  No erythema or increased warmth over legs  )  Lymphadenopathy:     She has no cervical adenopathy  Skin: Skin is warm  No rash noted  Nursing note and vitals reviewed

## 2023-09-12 NOTE — PROGRESS NOTES
Attending Note    POD#2  Pt c/o pain 4/10 to 8-9/10  On IV Dilaudid  LE quiet knee, min effusion, no drainage, good cap refil, sensation intact  S/P L TKA  Morbid obesity    Discussed pain mgmt w RN-- will wean/hold IV narcotics  PO oxycodone, celebrex, acetaminophen  Discussed in-pt rehab w [address] situation [address] into house  Pt not safe as of today    Await- disposition to rehab  Discussed w Ilya Quispe [facility] on rounds today    Octavia Baker MD  [phone] pt no c/o

## (undated) DEVICE — TRANSPOSAL ULTRAFLEX DUO/QUAD ULTRA CART MANIFOLD

## (undated) DEVICE — SCD SEQUENTIAL COMPRESSION COMFORT SLEEVE MEDIUM KNEE LENGTH: Brand: KENDALL SCD

## (undated) DEVICE — GLOVE SRG BIOGEL 7.5

## (undated) DEVICE — BAG DECANTER

## (undated) DEVICE — GLOVE INDICATOR PI UNDERGLOVE SZ 7 BLUE

## (undated) DEVICE — GLOVE SRG BIOGEL 9

## (undated) DEVICE — SUT VICRYL 2 TP-1 27 IN J849G

## (undated) DEVICE — GLOVE SRG BIOGEL 8

## (undated) DEVICE — CUFF TOURNIQUET 30 X 4 IN QUICK CONNECT DISP 1BLA

## (undated) DEVICE — SYRINGE 20ML LL

## (undated) DEVICE — INTENDED FOR TISSUE SEPARATION, AND OTHER PROCEDURES THAT REQUIRE A SHARP SURGICAL BLADE TO PUNCTURE OR CUT.: Brand: BARD-PARKER ® CARBON RIB-BACK BLADES

## (undated) DEVICE — TWIST DRILL 3.2MM DIA 127.0MM LONG

## (undated) DEVICE — SPONGE STICK WITH PVP-I: Brand: KENDALL

## (undated) DEVICE — SURGICAL GOWN, XL SMARTSLEEVE: Brand: CONVERTORS

## (undated) DEVICE — SAW BLADE RECIPROCATING 261

## (undated) DEVICE — SUT VICRYL 1 CP 27 IN J196H

## (undated) DEVICE — HOOD: Brand: FLYTE, SURGICOOL

## (undated) DEVICE — 3M™ TEGADERM™ TRANSPARENT FILM DRESSING FRAME STYLE, 1627, 4 IN X 10 IN (10 CM X 25 CM), 20/CT 4CT/CASE: Brand: 3M™ TEGADERM™

## (undated) DEVICE — CHLORAPREP HI-LITE 26ML ORANGE

## (undated) DEVICE — ADHESIVE SKN CLSR HISTOACRYL FLEX 0.5ML LF

## (undated) DEVICE — SYRINGE 50ML LL

## (undated) DEVICE — 3M™ TEGADERM™ TRANSPARENT FILM DRESSING FRAME STYLE, 1626W, 4 IN X 4-3/4 IN (10 CM X 12 CM), 50/CT 4CT/CASE: Brand: 3M™ TEGADERM™

## (undated) DEVICE — SUT VICRYL 2-0 CT-1 36 IN J945H

## (undated) DEVICE — STERILE KNEE PACK: Brand: CARDINAL HEALTH

## (undated) DEVICE — SPINNING CEMENT MIXING BOWL

## (undated) DEVICE — DRESSING MEPILEX AG BORDER 4 X 8 IN

## (undated) DEVICE — BANDAGE, ESMARK LF STR 6"X9' (20/CS): Brand: CYPRESS

## (undated) DEVICE — DRESSING MEPILEX BORDER 4 X 10 IN

## (undated) DEVICE — 3M™ DURAPORE™ SURGICAL TAPE 1538-3, 3 INCH X 10 YARD (7,5CM X 9,1M), 4 ROLLS/BOX: Brand: 3M™ DURAPORE™

## (undated) DEVICE — NEEDLE SPINAL 20G X 3.5 LF

## (undated) DEVICE — SUT STRATAFIX 2-0 60CM SXMB1B102

## (undated) DEVICE — GLOVE INDICATOR PI UNDERGLOVE SZ 8 BLUE

## (undated) DEVICE — BULB SYRINGE,IRRIGATION WITH PROTECTIVE CAP: Brand: DOVER

## (undated) DEVICE — BLADE SAW 5072-181

## (undated) DEVICE — SUT STRATAFIX SPIRAL PDS PLUS 1 CTX 18 IN SXPP1A400

## (undated) DEVICE — PADS GROUND

## (undated) DEVICE — BIPOLAR SEALER 23-301-1 AQM MBS: Brand: AQUAMANTYS™

## (undated) DEVICE — SUT VICRYL 1 CTX 36 IN J977H

## (undated) DEVICE — GLOVE SRG BIOGEL 7